# Patient Record
Sex: FEMALE | Race: WHITE | NOT HISPANIC OR LATINO
[De-identification: names, ages, dates, MRNs, and addresses within clinical notes are randomized per-mention and may not be internally consistent; named-entity substitution may affect disease eponyms.]

---

## 2018-01-05 PROBLEM — Z00.00 ENCOUNTER FOR PREVENTIVE HEALTH EXAMINATION: Status: ACTIVE | Noted: 2018-01-05

## 2018-02-01 ENCOUNTER — APPOINTMENT (OUTPATIENT)
Dept: GASTROENTEROLOGY | Facility: CLINIC | Age: 58
End: 2018-02-01
Payer: COMMERCIAL

## 2018-02-01 VITALS
WEIGHT: 214 LBS | HEIGHT: 63 IN | TEMPERATURE: 98.6 F | OXYGEN SATURATION: 99 % | DIASTOLIC BLOOD PRESSURE: 80 MMHG | SYSTOLIC BLOOD PRESSURE: 120 MMHG | HEART RATE: 82 BPM | BODY MASS INDEX: 37.92 KG/M2

## 2018-02-01 DIAGNOSIS — Z87.898 PERSONAL HISTORY OF OTHER SPECIFIED CONDITIONS: ICD-10-CM

## 2018-02-01 DIAGNOSIS — F15.90 OTHER STIMULANT USE, UNSPECIFIED, UNCOMPLICATED: ICD-10-CM

## 2018-02-01 DIAGNOSIS — Z78.9 OTHER SPECIFIED HEALTH STATUS: ICD-10-CM

## 2018-02-01 DIAGNOSIS — Z82.5 FAMILY HISTORY OF ASTHMA AND OTHER CHRONIC LOWER RESPIRATORY DISEASES: ICD-10-CM

## 2018-02-01 DIAGNOSIS — Z87.891 PERSONAL HISTORY OF NICOTINE DEPENDENCE: ICD-10-CM

## 2018-02-01 PROCEDURE — 99244 OFF/OP CNSLTJ NEW/EST MOD 40: CPT

## 2018-03-19 ENCOUNTER — APPOINTMENT (OUTPATIENT)
Dept: GASTROENTEROLOGY | Facility: AMBULATORY MEDICAL SERVICES | Age: 58
End: 2018-03-19
Payer: COMMERCIAL

## 2018-03-19 PROCEDURE — 45378 DIAGNOSTIC COLONOSCOPY: CPT

## 2018-03-19 PROCEDURE — 43239 EGD BIOPSY SINGLE/MULTIPLE: CPT | Mod: 59

## 2018-07-28 PROBLEM — Z78.9 ALCOHOL USE: Status: ACTIVE | Noted: 2018-02-01

## 2019-01-29 ENCOUNTER — APPOINTMENT (OUTPATIENT)
Dept: ORTHOPEDIC SURGERY | Facility: CLINIC | Age: 59
End: 2019-01-29
Payer: COMMERCIAL

## 2019-01-29 VITALS
HEIGHT: 63 IN | BODY MASS INDEX: 38.27 KG/M2 | HEART RATE: 74 BPM | SYSTOLIC BLOOD PRESSURE: 146 MMHG | DIASTOLIC BLOOD PRESSURE: 85 MMHG | WEIGHT: 216 LBS

## 2019-01-29 PROCEDURE — 73564 X-RAY EXAM KNEE 4 OR MORE: CPT | Mod: RT

## 2019-01-29 PROCEDURE — 99204 OFFICE O/P NEW MOD 45 MIN: CPT

## 2019-01-29 NOTE — PHYSICAL EXAM
[de-identified] : Patient is well nourished, well-developed, in no acute distress, with appropriate mood and affect. The patient is oriented to time, place, and person. Respirations are even and unlabored. Gait evaluation does not reveal a limp. There is no inguinal adenopathy. Examination of the contralateral knee shows normal range of motion, strength, no tenderness, and intact skin. The affected limb is well-perfused and showed 2+ dp/pt pulses, without skin lesions, shows a grossly normal motor and sensory examination. Knee motion is painless and the knee moves from 10-90 degrees. The knee is stable within that range-of-motion to AP and ML stress with a 1A Lachman, negative anterior or posterior drawer and no instability to varus or valgus stress. The alignment of the knee is 5 degrees of varus. No effusion or crepitus is noted.  Medial joint line tenderness.  Able to straight leg raise.  No tenderness to palpation about the lateral joint line, medial or lateral tibial plateau, medial or lateral femoral condyle, medial or lateral patellar facets, superior or inferior pole of the patella. June's is positive medially. Muscle strength is normal. Pedal pulses are palpable. Hip examination was negative. [de-identified] : Long standing knee, AP knee, lateral knee, and patellar views of the right knee were ordered and taken in the office and demonstrate degenerative joint disease of the patellofemoral compartment of the knee with joint space narrowing, osteophyte formation, and subchondral sclerosis.

## 2019-01-29 NOTE — DISCUSSION/SUMMARY
[de-identified] : This patient has right medial knee pain with some positive signs of meniscal injury.  Despite the fact that she does have patellofemoral osteoarthritis the main component of her pain is on the medial side of her knee.  I would like to obtain an MRI to better evaluate for a meniscal tear.  I would like the patient to follow-up with me in the office after obtaining the MRI to review imaging and plan next steps.  In the meantime I prescribed a course of Naprosyn as well as physical therapy.

## 2019-01-29 NOTE — DISCUSSION/SUMMARY
[de-identified] : This patient has right medial knee pain with some positive signs of meniscal injury.  Despite the fact that she does have patellofemoral osteoarthritis the main component of her pain is on the medial side of her knee.  I would like to obtain an MRI to better evaluate for a meniscal tear.  I would like the patient to follow-up with me in the office after obtaining the MRI to review imaging and plan next steps.  In the meantime I prescribed a course of Naprosyn as well as physical therapy.

## 2019-01-29 NOTE — HISTORY OF PRESENT ILLNESS
[de-identified] : This is very nice 58-year-old female experiencing 3 days of right knee pain, which is severe in intensity. The pain substantially limits activities of daily living. Walking tolerance is reduced.  The pain started after she slipped onto steps walking down some steps.  She does use a cane.  She does not use a knee brace.  The pain does not radiate down her leg and it is not associated with numbness or tingling or weakness.  She denies any catching or clicking or locking in the knee and the knee is not giving way on her.

## 2019-01-29 NOTE — HISTORY OF PRESENT ILLNESS
[de-identified] : This is very nice 58-year-old female experiencing 3 days of right knee pain, which is severe in intensity. The pain substantially limits activities of daily living. Walking tolerance is reduced.  The pain started after she slipped onto steps walking down some steps.  She does use a cane.  She does not use a knee brace.  The pain does not radiate down her leg and it is not associated with numbness or tingling or weakness.  She denies any catching or clicking or locking in the knee and the knee is not giving way on her.

## 2019-01-29 NOTE — PHYSICAL EXAM
[de-identified] : Patient is well nourished, well-developed, in no acute distress, with appropriate mood and affect. The patient is oriented to time, place, and person. Respirations are even and unlabored. Gait evaluation does not reveal a limp. There is no inguinal adenopathy. Examination of the contralateral knee shows normal range of motion, strength, no tenderness, and intact skin. The affected limb is well-perfused and showed 2+ dp/pt pulses, without skin lesions, shows a grossly normal motor and sensory examination. Knee motion is painless and the knee moves from 10-90 degrees. The knee is stable within that range-of-motion to AP and ML stress with a 1A Lachman, negative anterior or posterior drawer and no instability to varus or valgus stress. The alignment of the knee is 5 degrees of varus. No effusion or crepitus is noted.  Medial joint line tenderness.  Able to straight leg raise.  No tenderness to palpation about the lateral joint line, medial or lateral tibial plateau, medial or lateral femoral condyle, medial or lateral patellar facets, superior or inferior pole of the patella. June's is positive medially. Muscle strength is normal. Pedal pulses are palpable. Hip examination was negative. [de-identified] : Long standing knee, AP knee, lateral knee, and patellar views of the right knee were ordered and taken in the office and demonstrate degenerative joint disease of the patellofemoral compartment of the knee with joint space narrowing, osteophyte formation, and subchondral sclerosis.

## 2019-02-06 ENCOUNTER — OUTPATIENT (OUTPATIENT)
Dept: OUTPATIENT SERVICES | Facility: HOSPITAL | Age: 59
LOS: 1 days | End: 2019-02-06
Payer: COMMERCIAL

## 2019-02-06 ENCOUNTER — APPOINTMENT (OUTPATIENT)
Dept: MRI IMAGING | Facility: CLINIC | Age: 59
End: 2019-02-06

## 2019-02-06 DIAGNOSIS — M25.561 PAIN IN RIGHT KNEE: ICD-10-CM

## 2019-02-06 PROCEDURE — 73721 MRI JNT OF LWR EXTRE W/O DYE: CPT | Mod: 26,RT

## 2019-02-06 PROCEDURE — 73721 MRI JNT OF LWR EXTRE W/O DYE: CPT

## 2019-02-13 ENCOUNTER — APPOINTMENT (OUTPATIENT)
Dept: ORTHOPEDIC SURGERY | Facility: CLINIC | Age: 59
End: 2019-02-13
Payer: COMMERCIAL

## 2019-02-13 VITALS — HEIGHT: 63 IN | WEIGHT: 216 LBS | BODY MASS INDEX: 38.27 KG/M2

## 2019-02-13 PROCEDURE — 20610 DRAIN/INJ JOINT/BURSA W/O US: CPT | Mod: RT

## 2019-02-13 PROCEDURE — 99214 OFFICE O/P EST MOD 30 MIN: CPT | Mod: 25

## 2019-02-13 NOTE — HISTORY OF PRESENT ILLNESS
[0] : a current pain level of 0/10 [None] : No exacerbating factors are noted [de-identified] : Patient is an overweight female, PMHx gerd, presents to the office walking on her own complaining of right knee pain x 3 weeks, started after patient tripped and fell with her knee into ground,  progressively improving. Reports instability and weakness and swelling. Pain indicated to anteromedial aspect of knee radiating to posterior aspect, 5/10, constant, achy and sharp at times. Worsening with walking, laying down, prolonged sitting,  using stairs, or any activity that requires knee to move to the point affecting patient quality of life and preventing ADL's.  Some relief with rest, ice, seen Dr. Araiza who prescribed naproxen with some help. \par Patient denies any fever, chills, swelling, erythema, hematomas, numbness or tingling sensation,  buckling.\par

## 2019-02-13 NOTE — DISCUSSION/SUMMARY
[de-identified] : Moderate to severe degenerative arthritis of bilateral knees. We will try conservative treatment and since physical therapy range of motion strengthening. Patient is also considering an injection of the knee \par \par Right knee injection\par The risks and benefits of injection was discussed with the patient. Verbal consent was obtained from the patient. The area was prepped with Betadine. A lateral suprapatellar approach was used. The knee was injected with 2 cc of Depo-Medrol 2 cc of lidocaine.  The procedure well. Band-Aid was applied.\par Followup in 3 month

## 2019-02-13 NOTE — PHYSICAL EXAM
[Normal RUE] : Right Upper Extremity: No scars, rashes, lesions, ulcers, skin intact [Acute Distress] : not in acute distress [Obese] : obese [Normal] : No respiratory distress and lungs were clear to auscultation bilaterally [de-identified] : Patient appears stated age in no acute respiratory distress. Patient is alert oriented x3. Patient has normal mood and affect. Gait examination is normal\par Bilateral knee exam\par There is minimal to moderate effusion. Range of motion is 0-120°. Painful at the extremes of range of motion. \par There is medial and lateral joint line tenderness. \par Quadriceps strength is 4/5. There is some muscle loss in the quadriceps. \par Anteroposterior and mediolateral stability is intact June test is negative. Alignment of the knee is in 5° of varus.\par \par \par Bilateral hip exam\par On inspection of the hip shows skin is normal. No evidence of rash. \par No loss of muscle.  Abductor strength is 5 out of 5. Hip flexor strength is 5.\par Range of motion of the hip at 90° flexion internal rotation is 15° external rotation is 30° pain-free. \par Hip has good stability in anterior and posterior direction. \par On lateral decubitus  examination there is no tenderness in the greater trochanter. \par Lower Extremity Examination \par Bilateral lower extremity skin is normal. There is no rash. There is no edema and lymphadenopathy.  DP and PT pulses intact. Sensation is intact.\par  [de-identified] : patient presents to office with xrays and MRI\par shows moderate to severe degenerative arthritis of bilateral knees

## 2019-09-16 ENCOUNTER — APPOINTMENT (OUTPATIENT)
Dept: ORTHOPEDIC SURGERY | Facility: CLINIC | Age: 59
End: 2019-09-16
Payer: COMMERCIAL

## 2019-09-16 DIAGNOSIS — G89.29 PAIN IN RIGHT HIP: ICD-10-CM

## 2019-09-16 DIAGNOSIS — M25.551 PAIN IN RIGHT HIP: ICD-10-CM

## 2019-09-16 PROCEDURE — 99214 OFFICE O/P EST MOD 30 MIN: CPT | Mod: 25

## 2019-09-16 PROCEDURE — 73502 X-RAY EXAM HIP UNI 2-3 VIEWS: CPT | Mod: RT

## 2019-09-16 PROCEDURE — 20610 DRAIN/INJ JOINT/BURSA W/O US: CPT | Mod: RT

## 2020-02-12 ENCOUNTER — APPOINTMENT (OUTPATIENT)
Dept: ORTHOPEDIC SURGERY | Facility: CLINIC | Age: 60
End: 2020-02-12
Payer: COMMERCIAL

## 2020-02-12 PROCEDURE — 99214 OFFICE O/P EST MOD 30 MIN: CPT

## 2020-02-27 ENCOUNTER — APPOINTMENT (OUTPATIENT)
Dept: RADIOLOGY | Facility: CLINIC | Age: 60
End: 2020-02-27

## 2021-06-03 ENCOUNTER — APPOINTMENT (OUTPATIENT)
Dept: GASTROENTEROLOGY | Facility: CLINIC | Age: 61
End: 2021-06-03
Payer: COMMERCIAL

## 2021-06-03 DIAGNOSIS — K21.9 GASTRO-ESOPHAGEAL REFLUX DISEASE W/OUT ESOPHAGITIS: ICD-10-CM

## 2021-06-03 DIAGNOSIS — K59.09 OTHER CONSTIPATION: ICD-10-CM

## 2021-06-03 PROCEDURE — 99072 ADDL SUPL MATRL&STAF TM PHE: CPT

## 2021-06-03 PROCEDURE — 99244 OFF/OP CNSLTJ NEW/EST MOD 40: CPT

## 2021-06-03 RX ORDER — PANTOPRAZOLE 40 MG/1
40 TABLET, DELAYED RELEASE ORAL TWICE DAILY
Qty: 60 | Refills: 2 | Status: ACTIVE | COMMUNITY
Start: 2021-06-03 | End: 1900-01-01

## 2021-06-03 NOTE — ASSESSMENT
[FreeTextEntry1] : Trulance samples\par \par PPI \par \par 1) Bloating \par \par  A low FODMAP diet was discussed with the patient at length. The patient had multiple questions all of which were answered. I recommended a nutritionist. Also recommended that the patient keep a food diary. We discussed  options such as Vegetables. Fresh fruits. Dairy that is lactose-free, and hard cheeses, or ripened/matured cheeses including... Beef, pork, chicken, fish, eggs. Avoid breadcrumbs, marinades, and sauces/gravies that may be high in FODMAPs. Soy products including tofu, tempeh. Grains.\par \par \par 2) GAS\par \par We discussed Probiotics and limiting leafy vegetables and other changes\par \par 3) GERD\par \par We discussed a low acid , high protien diet \par \par Avoid Spicey oily greasy foods\par \par 4) Change in Bowels \par \par We discussed the proper use of fiber and water intake \par \par The causes of Bowel irregularities were discussed at length  We discussed : Eat three meals each day. Do not skip meals. Gradually increase the amount of FIBER  in your diet. Choose more whole grain breads, cereals and rice. Select more raw fruits and vegetables -- eat the peel, if appropriate. Read food labels and look for the "dietary fiber" content of foods. Good sources have 2 grams of fiber or more. Drink six to eight glasses of water each day. Limit highly refined and processed foods. \par \par \par Citrucel 1 scoop daily \par \par All questions answered\par \par Reading material given \par \par F/U in office after procedure or in 8 weeks \par \par \par The above medical issues were discussed in detail and all questions answered over a 45 minute period.\par

## 2021-06-03 NOTE — PHYSICAL EXAM
[General Appearance - Alert] : alert [General Appearance - In No Acute Distress] : in no acute distress [Sclera] : the sclera and conjunctiva were normal [PERRL With Normal Accommodation] : pupils were equal in size, round, and reactive to light [Extraocular Movements] : extraocular movements were intact [Outer Ear] : the ears and nose were normal in appearance [Oropharynx] : the oropharynx was normal [Neck Appearance] : the appearance of the neck was normal [Neck Cervical Mass (___cm)] : no neck mass was observed [Jugular Venous Distention Increased] : there was no jugular-venous distention [Thyroid Diffuse Enlargement] : the thyroid was not enlarged [Thyroid Nodule] : there were no palpable thyroid nodules [Auscultation Breath Sounds / Voice Sounds] : lungs were clear to auscultation bilaterally [Heart Rate And Rhythm] : heart rate was normal and rhythm regular [Heart Sounds] : normal S1 and S2 [Heart Sounds Gallop] : no gallops [Murmurs] : no murmurs [Heart Sounds Pericardial Friction Rub] : no pericardial rub [Normal] : normal [Soft, Nontender] : the abdomen was soft and nontender [No Mass] : no masses were palpated [Epigastric] : in the epigastric area [No HSM] : no hepatosplenomegaly noted [Abnormal Walk] : normal gait [Nail Clubbing] : no clubbing  or cyanosis of the fingernails [Musculoskeletal - Swelling] : no joint swelling seen [Motor Tone] : muscle strength and tone were normal [Skin Color & Pigmentation] : normal skin color and pigmentation [Skin Turgor] : normal skin turgor [] : no rash [Deep Tendon Reflexes (DTR)] : deep tendon reflexes were 2+ and symmetric [Sensation] : the sensory exam was normal to light touch and pinprick [No Focal Deficits] : no focal deficits

## 2021-06-15 DIAGNOSIS — Z12.11 ENCOUNTER FOR SCREENING FOR MALIGNANT NEOPLASM OF COLON: ICD-10-CM

## 2021-06-23 ENCOUNTER — APPOINTMENT (OUTPATIENT)
Dept: ORTHOPEDIC SURGERY | Facility: CLINIC | Age: 61
End: 2021-06-23
Payer: COMMERCIAL

## 2021-06-23 DIAGNOSIS — M16.11 UNILATERAL PRIMARY OSTEOARTHRITIS, RIGHT HIP: ICD-10-CM

## 2021-06-23 DIAGNOSIS — M17.12 UNILATERAL PRIMARY OSTEOARTHRITIS, LEFT KNEE: ICD-10-CM

## 2021-06-23 DIAGNOSIS — M17.11 UNILATERAL PRIMARY OSTEOARTHRITIS, RIGHT KNEE: ICD-10-CM

## 2021-06-23 DIAGNOSIS — M25.561 PAIN IN RIGHT KNEE: ICD-10-CM

## 2021-06-23 PROCEDURE — 99214 OFFICE O/P EST MOD 30 MIN: CPT | Mod: 25

## 2021-06-23 PROCEDURE — 20610 DRAIN/INJ JOINT/BURSA W/O US: CPT | Mod: RT

## 2021-06-23 PROCEDURE — 73502 X-RAY EXAM HIP UNI 2-3 VIEWS: CPT | Mod: RT

## 2021-06-23 PROCEDURE — 73562 X-RAY EXAM OF KNEE 3: CPT | Mod: LT

## 2021-06-24 PROBLEM — M17.12 PRIMARY OSTEOARTHRITIS OF LEFT KNEE: Status: ACTIVE | Noted: 2021-06-23

## 2021-06-24 PROBLEM — M16.11 PRIMARY OSTEOARTHRITIS OF RIGHT HIP: Status: ACTIVE | Noted: 2019-09-16

## 2021-07-20 ENCOUNTER — LABORATORY RESULT (OUTPATIENT)
Age: 61
End: 2021-07-20

## 2021-07-23 ENCOUNTER — APPOINTMENT (OUTPATIENT)
Dept: GASTROENTEROLOGY | Facility: HOSPITAL | Age: 61
End: 2021-07-23

## 2021-08-03 ENCOUNTER — INPATIENT (INPATIENT)
Facility: HOSPITAL | Age: 61
LOS: 2 days | Discharge: HOME HEALTH | DRG: 392 | End: 2021-08-06
Payer: COMMERCIAL

## 2021-08-03 VITALS
OXYGEN SATURATION: 98 % | WEIGHT: 199.96 LBS | TEMPERATURE: 98 F | SYSTOLIC BLOOD PRESSURE: 140 MMHG | DIASTOLIC BLOOD PRESSURE: 94 MMHG | RESPIRATION RATE: 18 BRPM | HEIGHT: 63 IN | HEART RATE: 110 BPM

## 2021-08-03 DIAGNOSIS — K57.80 DIVERTICULITIS OF INTESTINE, PART UNSPECIFIED, WITH PERFORATION AND ABSCESS WITHOUT BLEEDING: ICD-10-CM

## 2021-08-03 LAB
ALBUMIN SERPL ELPH-MCNC: 4.3 G/DL — SIGNIFICANT CHANGE UP (ref 3.3–5)
ALP SERPL-CCNC: 100 U/L — SIGNIFICANT CHANGE UP (ref 40–120)
ALT FLD-CCNC: 28 U/L — SIGNIFICANT CHANGE UP (ref 10–45)
ANION GAP SERPL CALC-SCNC: 12 MMOL/L — SIGNIFICANT CHANGE UP (ref 5–17)
APPEARANCE UR: CLEAR — SIGNIFICANT CHANGE UP
AST SERPL-CCNC: 24 U/L — SIGNIFICANT CHANGE UP (ref 10–40)
BACTERIA # UR AUTO: NEGATIVE — SIGNIFICANT CHANGE UP
BASE EXCESS BLDV CALC-SCNC: 3.9 MMOL/L — HIGH (ref -2–2)
BASOPHILS # BLD AUTO: 0.05 K/UL — SIGNIFICANT CHANGE UP (ref 0–0.2)
BASOPHILS NFR BLD AUTO: 0.3 % — SIGNIFICANT CHANGE UP (ref 0–2)
BILIRUB SERPL-MCNC: 0.7 MG/DL — SIGNIFICANT CHANGE UP (ref 0.2–1.2)
BILIRUB UR-MCNC: NEGATIVE — SIGNIFICANT CHANGE UP
BUN SERPL-MCNC: 12 MG/DL — SIGNIFICANT CHANGE UP (ref 7–23)
CA-I SERPL-SCNC: 1.14 MMOL/L — SIGNIFICANT CHANGE UP (ref 1.12–1.3)
CALCIUM SERPL-MCNC: 9.8 MG/DL — SIGNIFICANT CHANGE UP (ref 8.4–10.5)
CHLORIDE BLDV-SCNC: 103 MMOL/L — SIGNIFICANT CHANGE UP (ref 96–108)
CHLORIDE SERPL-SCNC: 98 MMOL/L — SIGNIFICANT CHANGE UP (ref 96–108)
CO2 BLDV-SCNC: 31 MMOL/L — HIGH (ref 22–30)
CO2 SERPL-SCNC: 26 MMOL/L — SIGNIFICANT CHANGE UP (ref 22–31)
COLOR SPEC: YELLOW — SIGNIFICANT CHANGE UP
CREAT SERPL-MCNC: 0.9 MG/DL — SIGNIFICANT CHANGE UP (ref 0.5–1.3)
DIFF PNL FLD: ABNORMAL
EOSINOPHIL # BLD AUTO: 0.03 K/UL — SIGNIFICANT CHANGE UP (ref 0–0.5)
EOSINOPHIL NFR BLD AUTO: 0.2 % — SIGNIFICANT CHANGE UP (ref 0–6)
EPI CELLS # UR: 1 /HPF — SIGNIFICANT CHANGE UP
GAS PNL BLDV: 133 MMOL/L — LOW (ref 135–145)
GAS PNL BLDV: SIGNIFICANT CHANGE UP
GAS PNL BLDV: SIGNIFICANT CHANGE UP
GLUCOSE BLDV-MCNC: 98 MG/DL — SIGNIFICANT CHANGE UP (ref 70–99)
GLUCOSE SERPL-MCNC: 102 MG/DL — HIGH (ref 70–99)
GLUCOSE UR QL: NEGATIVE — SIGNIFICANT CHANGE UP
HCO3 BLDV-SCNC: 29 MMOL/L — SIGNIFICANT CHANGE UP (ref 21–29)
HCT VFR BLD CALC: 45 % — SIGNIFICANT CHANGE UP (ref 34.5–45)
HCT VFR BLDA CALC: 49 % — SIGNIFICANT CHANGE UP (ref 39–50)
HGB BLD CALC-MCNC: 15.9 G/DL — HIGH (ref 11.5–15.5)
HGB BLD-MCNC: 14.9 G/DL — SIGNIFICANT CHANGE UP (ref 11.5–15.5)
HYALINE CASTS # UR AUTO: 0 /LPF — SIGNIFICANT CHANGE UP (ref 0–2)
IMM GRANULOCYTES NFR BLD AUTO: 0.5 % — SIGNIFICANT CHANGE UP (ref 0–1.5)
KETONES UR-MCNC: ABNORMAL
LACTATE BLDV-MCNC: 1.2 MMOL/L — SIGNIFICANT CHANGE UP (ref 0.7–2)
LEUKOCYTE ESTERASE UR-ACNC: NEGATIVE — SIGNIFICANT CHANGE UP
LIDOCAIN IGE QN: 15 U/L — SIGNIFICANT CHANGE UP (ref 7–60)
LYMPHOCYTES # BLD AUTO: 1.32 K/UL — SIGNIFICANT CHANGE UP (ref 1–3.3)
LYMPHOCYTES # BLD AUTO: 8.9 % — LOW (ref 13–44)
MCHC RBC-ENTMCNC: 29.6 PG — SIGNIFICANT CHANGE UP (ref 27–34)
MCHC RBC-ENTMCNC: 33.1 GM/DL — SIGNIFICANT CHANGE UP (ref 32–36)
MCV RBC AUTO: 89.3 FL — SIGNIFICANT CHANGE UP (ref 80–100)
MONOCYTES # BLD AUTO: 1.16 K/UL — HIGH (ref 0–0.9)
MONOCYTES NFR BLD AUTO: 7.9 % — SIGNIFICANT CHANGE UP (ref 2–14)
NEUTROPHILS # BLD AUTO: 12.13 K/UL — HIGH (ref 1.8–7.4)
NEUTROPHILS NFR BLD AUTO: 82.2 % — HIGH (ref 43–77)
NITRITE UR-MCNC: NEGATIVE — SIGNIFICANT CHANGE UP
NRBC # BLD: 0 /100 WBCS — SIGNIFICANT CHANGE UP (ref 0–0)
PCO2 BLDV: 48 MMHG — SIGNIFICANT CHANGE UP (ref 35–50)
PH BLDV: 7.4 — SIGNIFICANT CHANGE UP (ref 7.35–7.45)
PH UR: 6.5 — SIGNIFICANT CHANGE UP (ref 5–8)
PLATELET # BLD AUTO: 320 K/UL — SIGNIFICANT CHANGE UP (ref 150–400)
PO2 BLDV: 25 MMHG — SIGNIFICANT CHANGE UP (ref 25–45)
POTASSIUM BLDV-SCNC: 3.9 MMOL/L — SIGNIFICANT CHANGE UP (ref 3.5–5.3)
POTASSIUM SERPL-MCNC: 4.1 MMOL/L — SIGNIFICANT CHANGE UP (ref 3.5–5.3)
POTASSIUM SERPL-SCNC: 4.1 MMOL/L — SIGNIFICANT CHANGE UP (ref 3.5–5.3)
PROT SERPL-MCNC: 7.7 G/DL — SIGNIFICANT CHANGE UP (ref 6–8.3)
PROT UR-MCNC: ABNORMAL
RBC # BLD: 5.04 M/UL — SIGNIFICANT CHANGE UP (ref 3.8–5.2)
RBC # FLD: 12.6 % — SIGNIFICANT CHANGE UP (ref 10.3–14.5)
RBC CASTS # UR COMP ASSIST: 9 /HPF — HIGH (ref 0–4)
SAO2 % BLDV: 32 % — LOW (ref 67–88)
SODIUM SERPL-SCNC: 136 MMOL/L — SIGNIFICANT CHANGE UP (ref 135–145)
SP GR SPEC: 1.02 — SIGNIFICANT CHANGE UP (ref 1.01–1.02)
UROBILINOGEN FLD QL: NEGATIVE — SIGNIFICANT CHANGE UP
WBC # BLD: 14.76 K/UL — HIGH (ref 3.8–10.5)
WBC # FLD AUTO: 14.76 K/UL — HIGH (ref 3.8–10.5)
WBC UR QL: 2 /HPF — SIGNIFICANT CHANGE UP (ref 0–5)

## 2021-08-03 PROCEDURE — 99285 EMERGENCY DEPT VISIT HI MDM: CPT

## 2021-08-03 PROCEDURE — 74177 CT ABD & PELVIS W/CONTRAST: CPT | Mod: 26,MA

## 2021-08-03 RX ORDER — PIPERACILLIN AND TAZOBACTAM 4; .5 G/20ML; G/20ML
3.38 INJECTION, POWDER, LYOPHILIZED, FOR SOLUTION INTRAVENOUS ONCE
Refills: 0 | Status: COMPLETED | OUTPATIENT
Start: 2021-08-03 | End: 2021-08-03

## 2021-08-03 RX ORDER — DEXTROSE MONOHYDRATE, SODIUM CHLORIDE, AND POTASSIUM CHLORIDE 50; .745; 4.5 G/1000ML; G/1000ML; G/1000ML
1000 INJECTION, SOLUTION INTRAVENOUS
Refills: 0 | Status: DISCONTINUED | OUTPATIENT
Start: 2021-08-03 | End: 2021-08-05

## 2021-08-03 RX ORDER — ERTAPENEM SODIUM 1 G/1
1000 INJECTION, POWDER, LYOPHILIZED, FOR SOLUTION INTRAMUSCULAR; INTRAVENOUS EVERY 24 HOURS
Refills: 0 | Status: DISCONTINUED | OUTPATIENT
Start: 2021-08-04 | End: 2021-08-06

## 2021-08-03 RX ORDER — SODIUM CHLORIDE 9 MG/ML
1000 INJECTION, SOLUTION INTRAVENOUS ONCE
Refills: 0 | Status: COMPLETED | OUTPATIENT
Start: 2021-08-03 | End: 2021-08-03

## 2021-08-03 RX ORDER — ENOXAPARIN SODIUM 100 MG/ML
40 INJECTION SUBCUTANEOUS DAILY
Refills: 0 | Status: DISCONTINUED | OUTPATIENT
Start: 2021-08-03 | End: 2021-08-06

## 2021-08-03 RX ORDER — SODIUM CHLORIDE 9 MG/ML
1000 INJECTION INTRAMUSCULAR; INTRAVENOUS; SUBCUTANEOUS ONCE
Refills: 0 | Status: COMPLETED | OUTPATIENT
Start: 2021-08-03 | End: 2021-08-03

## 2021-08-03 RX ORDER — ERTAPENEM SODIUM 1 G/1
1000 INJECTION, POWDER, LYOPHILIZED, FOR SOLUTION INTRAMUSCULAR; INTRAVENOUS ONCE
Refills: 0 | Status: COMPLETED | OUTPATIENT
Start: 2021-08-03 | End: 2021-08-03

## 2021-08-03 RX ADMIN — SODIUM CHLORIDE 1000 MILLILITER(S): 9 INJECTION, SOLUTION INTRAVENOUS at 22:01

## 2021-08-03 RX ADMIN — SODIUM CHLORIDE 1000 MILLILITER(S): 9 INJECTION INTRAMUSCULAR; INTRAVENOUS; SUBCUTANEOUS at 12:31

## 2021-08-03 RX ADMIN — ERTAPENEM SODIUM 120 MILLIGRAM(S): 1 INJECTION, POWDER, LYOPHILIZED, FOR SOLUTION INTRAMUSCULAR; INTRAVENOUS at 16:33

## 2021-08-03 NOTE — ED PROVIDER NOTE - ATTENDING CONTRIBUTION TO CARE
Patient presenting complaining of three months of LLQ pains.  Initially diagnosed with diverticulitis, completed antibiotic course but pains persisting.  Outpatient GI was planning for endoscopy/colonoscopy but had issue with insurance paperwork.  History of prior UC as 19 year old, s/p resection.  Some associated nausea with pain but tolerating PO.  Pain present at baseline but worsens when she feels the need to pass stool.  Having small bowel movements at home, despite miralax/colace.    Exam:  General: Patient well appearing, vital signs within normal limits  HEENT: airway patent with moist mucous membranes  Cardiac: RRR S1/S2 with strong peripheral pulses  Respiratory: lungs clear without respiratory distress  GI: abdomen soft, non tender, non distended  Neuro: no gross neurologic deficits  Skin: warm, well perfused  Psych: normal mood and affect    Patient with multiple months of abdominal pains and abnormal stooling, history of prior UC s/p surgical resection.  Plan for labs, CT A/P to further evaluate.  Offered pain management but refused initially.

## 2021-08-03 NOTE — H&P ADULT - HISTORY OF PRESENT ILLNESS
PAST MEDICAL & SURGICAL HISTORY:      MEDICATIONS  (STANDING):  dextrose 5% + sodium chloride 0.45% with potassium chloride 20 mEq/L 1000 milliLiter(s) (125 mL/Hr) IV Continuous <Continuous>  enoxaparin Injectable 40 milliGRAM(s) SubCutaneous daily  lactated ringers Bolus 1000 milliLiter(s) IV Bolus once      ALLERGIES:    ___________________________________________  REVIEW OF SYSTEMS:  All ROS negative except as per HPI.    ___________________________________________  VITALS:  Vital Signs Last 24 Hrs  T(C): 36.9 (03 Aug 2021 11:09), Max: 36.9 (03 Aug 2021 11:09)  T(F): 98.5 (03 Aug 2021 11:09), Max: 98.5 (03 Aug 2021 11:09)  HR: 91 (03 Aug 2021 15:35) (91 - 110)  BP: 145/81 (03 Aug 2021 15:35) (137/86 - 145/81)  BP(mean): --  RR: 18 (03 Aug 2021 15:35) (18 - 18)  SpO2: 98% (03 Aug 2021 15:35) (96% - 98%)    CAPILLARY BLOOD GLUCOSE          I&O's Detail      PHYSICAL EXAM:  General: AAOx3, no acute distress.  Respiratory: breathing comfortably, no increased WOB   Abdomen: soft, nontender, nondistended, no rebound, no guarding  Extremities: Moves all four.     ____________________________________________  LABS:  CBC Full  -  ( 03 Aug 2021 12:40 )  WBC Count : 14.76 K/uL  RBC Count : 5.04 M/uL  Hemoglobin : 14.9 g/dL  Hematocrit : 45.0 %  Platelet Count - Automated : 320 K/uL  Mean Cell Volume : 89.3 fl  Mean Cell Hemoglobin : 29.6 pg  Mean Cell Hemoglobin Concentration : 33.1 gm/dL  Auto Neutrophil # : 12.13 K/uL  Auto Lymphocyte # : 1.32 K/uL  Auto Monocyte # : 1.16 K/uL  Auto Eosinophil # : 0.03 K/uL  Auto Basophil # : 0.05 K/uL  Auto Neutrophil % : 82.2 %  Auto Lymphocyte % : 8.9 %  Auto Monocyte % : 7.9 %  Auto Eosinophil % : 0.2 %  Auto Basophil % : 0.3 %        136  |  98  |  12  ----------------------------<  102<H>  4.1   |  26  |  0.90    Ca    9.8      03 Aug 2021 12:40    TPro  7.7  /  Alb  4.3  /  TBili  0.7  /  DBili  x   /  AST  24  /  ALT  28  /  AlkPhos  100  08-03    LIVER FUNCTIONS - ( 03 Aug 2021 12:40 )  Alb: 4.3 g/dL / Pro: 7.7 g/dL / ALK PHOS: 100 U/L / ALT: 28 U/L / AST: 24 U/L / GGT: x             Urinalysis Basic - ( 03 Aug 2021 14:42 )    Color: Yellow / Appearance: Clear / S.019 / pH: x  Gluc: x / Ketone: Moderate  / Bili: Negative / Urobili: Negative   Blood: x / Protein: Trace / Nitrite: Negative   Leuk Esterase: Negative / RBC: 9 /hpf / WBC 2 /HPF   Sq Epi: x / Non Sq Epi: 1 /hpf / Bacteria: Negative            ____________________________________________  RADIOLOGY & ADDITIONAL STUDIES:   Patient is a 60yo F with a history of ulcerative colitis (managed medically as a child, currently in remission), recent acute diverticulitis presenting with abdominal pain. Patient reports she was treated with cipro/flagyl for acute diverticulitis in [place] about 3 months ago but has been having worsening abdominal pain and nausea. Denies fevers, chills or emesis. Denies hematochezia or melena. Endorses loose BMs. Patient reports she has been trying to set up an outpatient colonoscopy but has not been able to schedule it. Her last colonoscopy was 4 years ago and reportedly normal.    In the ED, patient was initially tachycardic to 110 which improved to 90s with IVF resuscitation. Labs notable for WBC 14.8, normal lactate 1.2 CT scan showed acute contained perforated diverticulitis with no abscess. Received one dose of ertapenem.

## 2021-08-03 NOTE — H&P ADULT - ATTENDING COMMENTS
Prior episode of diverticulitis treated couple of months ago as outpatient.  This may be smoldering disease.  Patient with minimal tenderness.  No GI function yet so only sips and chips at this time.  Continue IV abx.

## 2021-08-03 NOTE — ED ADULT NURSE NOTE - OBJECTIVE STATEMENT
62 y/o presents to ED reporting over one month of abdominal pain. Pt reports being diagnosed with diverticulitis approximately one month ago and scheduled to go for colonoscpy/endoscopy but due to insurance reasons unable to get procedures done. On exam, AOx3, speaking in complete sentences. Unlabored, spontaneous respirations, NAD. Abdomen soft, non-tender, non-distended. Pt denies CP, SOB, n/v/d and fever/chills. Heplock placed, labs sent. Seen and evaluated by MD. Awaiting imaging at this time.

## 2021-08-03 NOTE — ED PROVIDER NOTE - OBJECTIVE STATEMENT
61 year old female PMHX diverticulitis presents to ED presenting with abdominal pain x3 months, associated with constipation, and nausea. Patient states she was diagnosed with diverticulitis x1 month ago confirmed by CT in [place], given abx, and advised to follow up with GI. When she followed up with GI he scheduled her for Colonscopy and endoscopy but was cancelled last week due to insurance reasons. Patient states pain has gotten intolerable, and wanted further eval of her pain. Patient states intermittent chills, nausea, denies fever, sick contacts, shortness of breath, chest pain, vomiting, leg swelling, recent travel.    Not vaccinated, Never got covid

## 2021-08-03 NOTE — H&P ADULT - NSHPLABSRESULTS_GEN_ALL_CORE
14.9   14.76 )-----------( 320      ( 03 Aug 2021 12:40 )             45.0       08    136  |  98  |  12  ----------------------------<  102<H>  4.1   |  26  |  0.90    Ca    9.8      03 Aug 2021 12:40    TPro  7.7  /  Alb  4.3  /  TBili  0.7  /  DBili  x   /  AST  24  /  ALT  28  /  AlkPhos  100  08-          Urinalysis Basic - ( 03 Aug 2021 14:42 )    Color: Yellow / Appearance: Clear / S.019 / pH: x  Gluc: x / Ketone: Moderate  / Bili: Negative / Urobili: Negative   Blood: x / Protein: Trace / Nitrite: Negative   Leuk Esterase: Negative / RBC: 9 /hpf / WBC 2 /HPF   Sq Epi: x / Non Sq Epi: 1 /hpf / Bacteria: Negative    Lactate Trend 1.2      _______________________________________  RADIOLOGY & ADDITIONAL STUDIES:    < from: CT Abdomen and Pelvis w/ IV Cont (21 @ 14:12) >    EXAM:  CT ABDOMEN AND PELVIS IC                          PROCEDURE DATE:  2021      INTERPRETATION:  CLINICAL INFORMATION: Abdominal pain associated with constipation and nausea. Diverticulitis one month ago.    COMPARISON: None.    CONTRAST/COMPLICATIONS:  IV Contrast: Omnipaque 350  90 cc administered   10 cc discarded  Oral Contrast: None  Complications: None reported at study completion    PROCEDURE:  CT of the Abdomen and Pelvis was performed.  Sagittal and coronal reformats were performed.    FINDINGS:  LOWER CHEST: Within normal limits.    LIVER: Within normal limits.  BILE DUCTS: Normal caliber.  GALLBLADDER: Within normal limits.  SPLEEN: Within normal limits.  PANCREAS: Within normal limits.  ADRENALS: Within normal limits.  KIDNEYS/URETERS: No hydronephrosis. Bilateral subcentimeter hypodense foci too small to characterize. A 5 mm nonobstructing left lower pole calculus.    BLADDER: Within normal limits.  REPRODUCTIVE ORGANS: Uterus and adnexa within normal limits.    BOWEL: Fat stranding and wall thickening involving a short segment region of sigmoid colon in a region of diverticulosis with contained extraluminal gas (2:89) and moderate pericolonic inflammatory changes. No bowel obstruction. Appendix is normal.  PERITONEUM: Contained extraluminal gas in the pelvis, as described above.  Trace ascites.  VESSELS: Within normal limits.  RETROPERITONEUM/LYMPH NODES: Prominent periaortic lymph nodes measuring less than 1 cm in short axis.  ABDOMINAL WALL: Within normal limits.  BONES: Degenerative changes of the spine.    IMPRESSION:  Acute, contained perforation of the sigmoid colon with moderate pericolonic inflammatory changes.  Findings may be secondary to perforated diverticulitis, however, colonoscopy is recommended after resolution of the acute symptomatology to exclude other etiologies.    Trace ascites.    Several small periaortic lymph nodes.    Nonobstructing 0.5 cm left intrarenal calculus.    < end of copied text >

## 2021-08-03 NOTE — H&P ADULT - ASSESSMENT
Patient is a 60yo F with a history of ulcerative colitis (managed medically as a child, currently in remission), recent acute diverticulitis presenting with abdominal pain found to have acute diverticulitis with contained perforation. Patient hemodynamically stable and nontoxic appearing.    Plan:  - Admit to [facility] [place] surgery floor bed, under Dr. Silvestre  - NPO/IVF  - Ertapenem (allergy to PCN)  - Exam prior to pain meds to assess for worsening clinical status  - DVT ppx    Discussed with fellow on behalf of attending.    Héctor Fraire, PGY2  [facility] [phone]

## 2021-08-03 NOTE — H&P ADULT - NSHPPHYSICALEXAM_GEN_ALL_CORE
VITALS:  Vital Signs Last 24 Hrs  T(C): 37.4 (03 Aug 2021 22:58), Max: 37.4 (03 Aug 2021 22:58)  T(F): 99.4 (03 Aug 2021 22:58), Max: 99.4 (03 Aug 2021 22:58)  HR: 88 (03 Aug 2021 22:58) (87 - 110)  BP: 148/89 (03 Aug 2021 22:58) (135/89 - 148/89)  BP(mean): --  RR: 18 (03 Aug 2021 22:58) (16 - 18)  SpO2: 99% (03 Aug 2021 22:58) (96% - 99%)    PHYSICAL EXAM:  Gen: No acute distress.  Pulm: No respiratory distress.   Abd: Soft, mild LLQ and suprapubic tenderness, nondistended, no rebound, no guarding.  Ext: warm, well-perfused

## 2021-08-03 NOTE — ED PROVIDER NOTE - PROGRESS NOTE DETAILS
Spoke to surgery regarding patients case. Recommending ertapenem and will come evaluate the patient. Spoke surgery, who states will see another consult first and than come evaluate patient. Spoke to surgery, states are going to evaluate patient now.

## 2021-08-03 NOTE — ED ADULT NURSE REASSESSMENT NOTE - NS ED NURSE REASSESS COMMENT FT1
Problem: Dysphagia (Adult) Goal: *Acute Goals and Plan of Care (Insert Text) Description Recommendations: 
Diet: mechanical-soft/honey-thick liquids (NO STRAWS) Meds: crushed in applesauce/pudding Aspiration Precautions Oral Care TID Other: SILENT ASPIRATION Goals:  Patient will: 1. Tolerate PO trials with 0 s/s overt distress in 4/5 trials 2. Utilize compensatory swallow strategies/maneuvers (decrease bite/sip, size/rate, alt. liq/sol) with min cues in 4/5 trials 3. Perform oral-motor/laryngeal exercises to increase oropharyngeal swallow function with min cues 4. Complete an objective swallow study (i.e., MBSS) to assess swallow integrity, r/o aspiration, and determine of safest LRD, min A - goal met 4/17/19 
 - Outcome: Progressing Towards Goal 
 
SPEECH LANGUAGE PATHOLOGY DYSPHAGIA TREATMENT Patient: Radha Brody (23 y.o. male) Date: 4/24/2019 Diagnosis: Chronic bilateral pleural effusions [J90] HCAP (healthcare-associated pneumonia) [J18.9] CHF (congestive heart failure) ([facility] [address] [I50.9] Bilateral pleural effusion [J90] HCAP (healthcare-associated pneumonia) [J18.9] CHF (congestive heart failure) ([facility]) [I50.9] Chronic bilateral pleural effusions Precautions: aspiration PLOF:as per H&P  
 
ASSESSMENT: 
Pt seen b/s for dysphagia tx. Cleared for po feeds by nursing as pt unable to participate in procedure this date due to being on Asprin. Pt awake, alert, breathy vocal quality with intermittent phonation breaks, hoarse vocal quality, edentulous, functional oral motor skills for mastication and deglutition. Pt participated in [facility] 4/17/19 with the following results: 
\"MBS completed with SILENT aspiration on thin and nectar-thick liquids via cup and straw sips. Attempted chin tuck for airway protection; however, unsuccessful in protecting airway secondary to absent epiglottic inversion.  Honey-thick liquids accepted via cup sips with moderate Pt medicated per EMAR. Awaiting surgical consultation at this time. Pt provided phone to call . penetration; pt requires cues for frequent throat clears to clear penetrate. Pudding and cracker accepted without aspiration. Honey-thick liquid wash utilized for cracker in attempt to clear residue; ~50% of residue cleared without aspiration. \"  
 Pt able to recall participating but unable to recall results. Reviewed results above with dietary recs and compensatory swallow strategies. Pt accepted honey thick liquids via cup and single presentation of mechanical soft solid with mildly prolonged mastication, moderate oral residue requiring spontaneous liquid wash to clear. Pt SpO2 fluctuating between 80- 93, ?effectiveness of monitor? Pt demo fairly timely swallow response with decreased laryngeal elevation, intermittent gurgly vocal quality requiring max verbal/ auditory cues to clear and repeat swallow, multiple swallow response. Educated pt to and encouraged to complete effortful swallows with each presentation, and supraglottic swallow. Pt continued to require mod verbal/ auditory cues throughout remainder of session to complete. Rec: mechanical soft solids with HONEY thick liquids for quality of life, high risk of aspiration, strict aspiration precautions. **Due to above with no epiglottic inversion, there is a great chance pt may be aspirating his own secretions. Pt/ family may benefit from Palliative care consult. SLP will continue to follow. Progression toward goals: 
?         Improving appropriately and progressing toward goals ? Improving slowly and progressing toward goals ? Not making progress toward goals and plan of care will be adjusted PLAN: 
Recommendations and Planned Interventions: 
mechanical soft solids with HONEY thick liquids for quality of life, high risk of aspiration, strict aspiration precautions. Patient continues to benefit from skilled intervention to address the above impairments. Continue treatment per established plan of care. Discharge Recommendations: To Be Determined SUBJECTIVE:  
Patient stated ? I'm mike thirsty? . 
 
OBJECTIVE:  
Cognitive and Communication Status: 
Neurologic State: Alert Orientation Level: Oriented to person Cognition: Follows commands Perception: Appears intact Perseveration: No perseveration noted Safety/Judgement: Fall prevention Dysphagia Treatment: 
Oral Assessment: 
Oral Assessment Labial: No impairment Dentition: Edentulous Oral Hygiene: good Lingual: No impairment Velum: No impairment Mandible: No impairment P.O. Trials: 
Patient Position: [facility] 60* Vocal quality prior to P.O.: Breathy, Low volume, Phonation breaks Consistency Presented: Honey thick liquid, Mechanical soft How Presented: SLP-fed/presented, Cup/sip Bolus Acceptance: No impairment Bolus Formation/Control: Impaired Type of Impairment: Mastication, Delayed Propulsion: Delayed (# of seconds) Oral Residue: 10-50% of bolus, Lingual 
 Initiation of Swallow: No impairment Laryngeal Elevation: Decreased Aspiration Signs/Symptoms: Decrease in O2 saturations, Change vocal quality Pharyngeal Phase Characteristics: Audible swallow, Altered vocal quality, Multiple swallows, Suspected pharyngeal residue Effective Modifications: Alternate liquids/solids, Cup/sip, Small sips and bites, Supraglottic swallow Cues for Modifications: Moderate Oral Phase Severity: Mild Pharyngeal Phase Severity : Severe Oral Motor Exercises: 
  
   
   
   
   
   
   
   
   
   
   
   
   
   
   
   
   
   
   
   
   
   
   
   
   
   
   
   
   
   
   
   
   
   
   
   
   
   
   
   
   
   
   
   
   
   
   
   
   
   
   
   
   
   
  
Exercises: 
Laryngeal Exercises: 
Sustained \"ah\": No 
  
  
Mendelsohn Maneuver: No 
  
  
Effortful Swallow: Yes Sets : 1 Reps : 5 
Supraglottic Swallow: No 
  
  
Super-Supraglottic Swallow: No 
  
  
Incentive Spirometer: No 
  
  
  
Maria Isabel: Yes Sets : 1 Reps : 5 
 Sing \"EEE\": Yes Sets : 1 Reps : 15 Shaker: No 
  
  
Look Up at Ceiling/Gargle: No 
  
  
Open Mouth Wide/Yawn: No 
  
  
Tongue Back & Hold: No 
  
  
Effortful Breath Hold: No 
  
  
Hard Glottal Attack: No 
  
  
PAIN: 
Pain level pre-treatment: 0/10 Pain level post-treatment: 0/10 Pain Intervention(s): Medication (see MAR); Rest, Ice, Repositioning Response to intervention: Nurse notified, See doc flow After treatment:  
?              Patient left in no apparent distress sitting up in chair ? Patient left in no apparent distress in bed 
? Call bell left within reach ? Nursing notified ? Family present ? Caregiver present ? Bed alarm activated COMMUNICATION/EDUCATION:  
? Aspiration precautions; swallow safety; compensatory techniques ? Patient unable to participate in education; education ongoing with staff ? Posted safety precautions in patient's room. ? Oral-motor/laryngeal strengthening exercises Noni Chen MS, CCC/SLP Time Calculation: 25 mins

## 2021-08-04 ENCOUNTER — TRANSCRIPTION ENCOUNTER (OUTPATIENT)
Age: 61
End: 2021-08-04

## 2021-08-04 LAB
ANION GAP SERPL CALC-SCNC: 13 MMOL/L — SIGNIFICANT CHANGE UP (ref 5–17)
BUN SERPL-MCNC: 8 MG/DL — SIGNIFICANT CHANGE UP (ref 7–23)
CALCIUM SERPL-MCNC: 9.1 MG/DL — SIGNIFICANT CHANGE UP (ref 8.4–10.5)
CHLORIDE SERPL-SCNC: 100 MMOL/L — SIGNIFICANT CHANGE UP (ref 96–108)
CO2 SERPL-SCNC: 21 MMOL/L — LOW (ref 22–31)
COVID-19 SPIKE DOMAIN AB INTERP: NEGATIVE — SIGNIFICANT CHANGE UP
COVID-19 SPIKE DOMAIN ANTIBODY RESULT: 0.4 U/ML — SIGNIFICANT CHANGE UP
CREAT SERPL-MCNC: 0.8 MG/DL — SIGNIFICANT CHANGE UP (ref 0.5–1.3)
GLUCOSE SERPL-MCNC: 122 MG/DL — HIGH (ref 70–99)
HCT VFR BLD CALC: 42.2 % — SIGNIFICANT CHANGE UP (ref 34.5–45)
HCV AB S/CO SERPL IA: 0.48 S/CO — SIGNIFICANT CHANGE UP (ref 0–0.99)
HCV AB SERPL-IMP: SIGNIFICANT CHANGE UP
HGB BLD-MCNC: 13.9 G/DL — SIGNIFICANT CHANGE UP (ref 11.5–15.5)
MAGNESIUM SERPL-MCNC: 2 MG/DL — SIGNIFICANT CHANGE UP (ref 1.6–2.6)
MCHC RBC-ENTMCNC: 29.6 PG — SIGNIFICANT CHANGE UP (ref 27–34)
MCHC RBC-ENTMCNC: 32.9 GM/DL — SIGNIFICANT CHANGE UP (ref 32–36)
MCV RBC AUTO: 89.8 FL — SIGNIFICANT CHANGE UP (ref 80–100)
NRBC # BLD: 0 /100 WBCS — SIGNIFICANT CHANGE UP (ref 0–0)
PHOSPHATE SERPL-MCNC: 2.6 MG/DL — SIGNIFICANT CHANGE UP (ref 2.5–4.5)
PLATELET # BLD AUTO: 286 K/UL — SIGNIFICANT CHANGE UP (ref 150–400)
POTASSIUM SERPL-MCNC: 3.7 MMOL/L — SIGNIFICANT CHANGE UP (ref 3.5–5.3)
POTASSIUM SERPL-SCNC: 3.7 MMOL/L — SIGNIFICANT CHANGE UP (ref 3.5–5.3)
RBC # BLD: 4.7 M/UL — SIGNIFICANT CHANGE UP (ref 3.8–5.2)
RBC # FLD: 12.7 % — SIGNIFICANT CHANGE UP (ref 10.3–14.5)
SARS-COV-2 IGG+IGM SERPL QL IA: 0.4 U/ML — SIGNIFICANT CHANGE UP
SARS-COV-2 IGG+IGM SERPL QL IA: NEGATIVE — SIGNIFICANT CHANGE UP
SARS-COV-2 RNA SPEC QL NAA+PROBE: SIGNIFICANT CHANGE UP
SODIUM SERPL-SCNC: 134 MMOL/L — LOW (ref 135–145)
WBC # BLD: 12.68 K/UL — HIGH (ref 3.8–10.5)
WBC # FLD AUTO: 12.68 K/UL — HIGH (ref 3.8–10.5)

## 2021-08-04 RX ORDER — SODIUM,POTASSIUM PHOSPHATES 278-250MG
2 POWDER IN PACKET (EA) ORAL ONCE
Refills: 0 | Status: COMPLETED | OUTPATIENT
Start: 2021-08-04 | End: 2021-08-04

## 2021-08-04 RX ORDER — POLYETHYLENE GLYCOL 3350 17 G/17G
17 POWDER, FOR SOLUTION ORAL ONCE
Refills: 0 | Status: COMPLETED | OUTPATIENT
Start: 2021-08-04 | End: 2021-08-04

## 2021-08-04 RX ORDER — SENNA PLUS 8.6 MG/1
1 TABLET ORAL AT BEDTIME
Refills: 0 | Status: DISCONTINUED | OUTPATIENT
Start: 2021-08-04 | End: 2021-08-06

## 2021-08-04 RX ADMIN — DEXTROSE MONOHYDRATE, SODIUM CHLORIDE, AND POTASSIUM CHLORIDE 125 MILLILITER(S): 50; .745; 4.5 INJECTION, SOLUTION INTRAVENOUS at 13:32

## 2021-08-04 RX ADMIN — POLYETHYLENE GLYCOL 3350 17 GRAM(S): 17 POWDER, FOR SOLUTION ORAL at 16:03

## 2021-08-04 RX ADMIN — ENOXAPARIN SODIUM 40 MILLIGRAM(S): 100 INJECTION SUBCUTANEOUS at 13:33

## 2021-08-04 RX ADMIN — DEXTROSE MONOHYDRATE, SODIUM CHLORIDE, AND POTASSIUM CHLORIDE 125 MILLILITER(S): 50; .745; 4.5 INJECTION, SOLUTION INTRAVENOUS at 16:04

## 2021-08-04 RX ADMIN — Medication 2 PACKET(S): at 13:32

## 2021-08-04 RX ADMIN — SENNA PLUS 1 TABLET(S): 8.6 TABLET ORAL at 22:25

## 2021-08-04 RX ADMIN — ERTAPENEM SODIUM 120 MILLIGRAM(S): 1 INJECTION, POWDER, LYOPHILIZED, FOR SOLUTION INTRAMUSCULAR; INTRAVENOUS at 16:03

## 2021-08-04 RX ADMIN — DEXTROSE MONOHYDRATE, SODIUM CHLORIDE, AND POTASSIUM CHLORIDE 125 MILLILITER(S): 50; .745; 4.5 INJECTION, SOLUTION INTRAVENOUS at 03:50

## 2021-08-04 NOTE — DISCHARGE NOTE PROVIDER - HOSPITAL COURSE
Patient is a 62 yo F with a history of ulcerative colitis (managed medically as a child, currently in remission), recent acute diverticulitis presenting with abdominal pain. Patient reports she was treated with cipro/flagyl for acute diverticulitis in [place] about 3 months ago but has been having worsening abdominal pain and nausea. Denies fevers, chills or emesis. Denies hematochezia or melena. Endorses loose BMs. Patient reports she has been trying to set up an outpatient colonoscopy but has not been able to schedule it. Her last colonoscopy was 4 years ago and reportedly normal. In the ED, patient was initially tachycardic to 110 which improved to 90s with IVF resuscitation. Labs notable for WBC 14.8, normal lactate 1.2 CT scan showed acute contained perforated diverticulitis with no abscess. Received one dose of ertapenem. The patient was admitted to Dr. Silvestre, was made NPO and started on IVF. The pt was started on IV abx and dvt ppx. Over night there were no acute overnight events. The patient is hemodynamically stable and non toxic appearing. The pt's diet was advanced as tolerated and iv abx were continued. 8/5: ****************    On day of discharge, the patient's vitals are stable, is tolerating a regular diet, voiding appropriately, ambulating well and pain controlled. The pt will f/u with Dr. Silvestre in 1-2 weeks outpt and will f/u with her PCP in 1-2 weeks.   Patient is a 60 yo F with a history of ulcerative colitis (managed medically as a child, currently in remission), recent acute diverticulitis presenting with abdominal pain. Patient reports she was treated with cipro/flagyl for acute diverticulitis in [place] about 3 months ago but has been having worsening abdominal pain and nausea. Denies fevers, chills or emesis. Denies hematochezia or melena. Endorses loose BMs. Patient reports she has been trying to set up an outpatient colonoscopy but has not been able to schedule it. Her last colonoscopy was 4 years ago and reportedly normal. In the ED, patient was initially tachycardic to 110 which improved to 90s with IVF resuscitation. Labs notable for WBC 14.8, normal lactate 1.2 CT scan showed acute contained perforated diverticulitis with no abscess. Received one dose of ertapenem. The patient was admitted to Dr. Silvestre, was made NPO and started on IVF. The pt was started on IV abx and dvt ppx. Over night there were no acute overnight events. The patient is hemodynamically stable and non toxic appearing. The pt's diet was advanced as tolerated and iv abx were continued. 8/5.     On day of discharge, the patient's vitals are stable, is tolerating a regular diet, voiding appropriately, ambulating well and pain controlled. The pt will f/u with Dr. Silvestre in 1-2 weeks outpt and will f/u with her PCP in 1-2 weeks.   Patient is a 60 yo F with a history of ulcerative colitis (managed medically as a child, currently in remission), recent acute diverticulitis presenting with abdominal pain. Patient reports she was treated with cipro/flagyl for acute diverticulitis in [place] about 3 months ago but has been having worsening abdominal pain and nausea. Denies fevers, chills or emesis. Denies hematochezia or melena. Endorses loose BMs. Patient reports she has been trying to set up an outpatient colonoscopy but has not been able to schedule it. Her last colonoscopy was 4 years ago and reportedly normal. In the ED, patient was initially tachycardic to 110 which improved to 90s with IVF resuscitation. Labs notable for WBC 14.8, normal lactate 1.2 CT scan showed acute contained perforated diverticulitis with no abscess. Received one dose of ertapenem. The patient was admitted to Dr. Silvestre, was made NPO and started on IVF. The pt was started on IV abx and dvt ppx. Over night there were no acute overnight events. The patient is hemodynamically stable and non toxic appearing. The pt's diet was advanced as tolerated and iv abx were continued. 8/5: The patient is doing well, midline was placed. She will be going home with IV abx for 10 days. On day of discharge, the patient's vitals are stable, is tolerating a regular diet, voiding appropriately, ambulating well and pain controlled. The pt will f/u with Dr. Silvestre in 2 weeks outpt and will f/u with her PCP in 1-2 weeks.   Patient is a 60 yo F with a history of ulcerative colitis (managed medically as a child, currently in remission), recent acute diverticulitis presenting with abdominal pain. Patient reports she was treated with cipro/flagyl for acute diverticulitis in [place] about 3 months ago but has been having worsening abdominal pain and nausea. Denies fevers, chills or emesis. Denies hematochezia or melena. Endorses loose BMs. Patient reports she has been trying to set up an outpatient colonoscopy but has not been able to schedule it. Her last colonoscopy was 4 years ago and reportedly normal. In the ED, patient was initially tachycardic to 110 which improved to 90s with IVF resuscitation. Labs notable for WBC 14.8, normal lactate 1.2 CT scan showed acute contained perforated diverticulitis with no abscess. Received one dose of ertapenem. The patient was admitted to Dr. Silvestre, was made NPO and started on IVF. The pt was started on IV abx and dvt ppx. Over night there were no acute overnight events. The patient is hemodynamically stable and non toxic appearing. The pt's diet was advanced as tolerated and iv abx were continued. 8/5: The patient is doing well, midline was placed. She will be going home with IV abx for 10 days. On day of discharge, the patient's vitals are stable, is tolerating a regular diet, voiding appropriately, ambulating well and pain controlled. The pt will f/u with Dr. Silvestre in 2 weeks outpt and will f/u with her PCP in 2 weeks.   Patient is a 60 yo F with a history of ulcerative colitis (managed medically as a child, currently in remission), recent acute diverticulitis presenting with abdominal pain.  She was admitted for SIRS secondary to [place] 2  diverticulitis. Patient reports she was treated with cipro/flagyl for acute diverticulitis in [place] about 3 months ago but has been having worsening abdominal pain and nausea. Denies fevers, chills or emesis. Denies hematochezia or melena. Endorses loose BMs. Patient reports she has been trying to set up an outpatient colonoscopy but has not been able to schedule it. Her last colonoscopy was 4 years ago and reportedly normal. In the ED, patient was initially tachycardic to 110 which improved to 90s with IVF resuscitation. Labs notable for WBC 14.8, normal lactate 1.2 CT scan showed acute contained perforated diverticulitis with no abscess. Received one dose of ertapenem. The patient was admitted to Dr. Silvestre, was made NPO and started on IVF. The pt was started on IV abx and dvt ppx. Over night there were no acute overnight events. The patient is hemodynamically stable and non toxic appearing. The pt's diet was advanced as tolerated and iv abx were continued. 8/5: The patient is doing well, midline was placed. She will be going home with IV abx for 10 days. On day of discharge, the patient's vitals are stable, is tolerating a regular diet, voiding appropriately, ambulating well and pain controlled. The pt will f/u with Dr. Silvestre in 2 weeks outpt and will f/u with her PCP in 2 weeks.

## 2021-08-04 NOTE — PROGRESS NOTE ADULT - SUBJECTIVE AND OBJECTIVE BOX
SURGERY  Pager:    STATUS POST:      POST OPERATIVE DAY #      INTERVAL EVENTS/SUBJECTIVE: No acute events overnight. On morning rounds...     ______________________________________________  OBJECTIVE:   T(C): 37.3 (08-04-21 @ 00:28), Max: 37.4 (08-03-21 @ 22:58)  HR: 85 (08-04-21 @ 00:28) (85 - 110)  BP: 145/79 (08-04-21 @ 00:28) (135/89 - 148/89)  RR: 18 (08-04-21 @ 00:28) (16 - 18)  SpO2: 97% (08-04-21 @ 00:28) (96% - 99%)  Wt(kg): --  CAPILLARY BLOOD GLUCOSE        I&O's Detail      Physical exam:  Gen: resting in bed comfortably in NAD  Chest: no increased WOB, regular inspiratory effort   Abdomen: Soft, nontender, nondistended with no rebound tenderness or guarding. Incisions clean, dry, intact, with no erythema.   Vascular: WWP, SCHULTZ x4  NEURO: awake, alert  ______________________________________________  LABS:  CBC Full  -  ( 03 Aug 2021 12:40 )  WBC Count : 14.76 K/uL  RBC Count : 5.04 M/uL  Hemoglobin : 14.9 g/dL  Hematocrit : 45.0 %  Platelet Count - Automated : 320 K/uL  Mean Cell Volume : 89.3 fl  Mean Cell Hemoglobin : 29.6 pg  Mean Cell Hemoglobin Concentration : 33.1 gm/dL  Auto Neutrophil # : 12.13 K/uL  Auto Lymphocyte # : 1.32 K/uL  Auto Monocyte # : 1.16 K/uL  Auto Eosinophil # : 0.03 K/uL  Auto Basophil # : 0.05 K/uL  Auto Neutrophil % : 82.2 %  Auto Lymphocyte % : 8.9 %  Auto Monocyte % : 7.9 %  Auto Eosinophil % : 0.2 %  Auto Basophil % : 0.3 %    08-03    136  |  98  |  12  ----------------------------<  102<H>  4.1   |  26  |  0.90    Ca    9.8      03 Aug 2021 12:40    TPro  7.7  /  Alb  4.3  /  TBili  0.7  /  DBili  x   /  AST  24  /  ALT  28  /  AlkPhos  100  08-03    _____________________________________________  RADIOLOGY:     SURGERY  Pager: #[phone]    INTERVAL EVENTS/SUBJECTIVE: No acute events overnight. On morning rounds...     ______________________________________________  OBJECTIVE:   T(C): 37.3 (08-04-21 @ 00:28), Max: 37.4 (08-03-21 @ 22:58)  HR: 85 (08-04-21 @ 00:28) (85 - 110)  BP: 145/79 (08-04-21 @ 00:28) (135/89 - 148/89)  RR: 18 (08-04-21 @ 00:28) (16 - 18)  SpO2: 97% (08-04-21 @ 00:28) (96% - 99%)  Wt(kg): --  CAPILLARY BLOOD GLUCOSE        I&O's Detail      Physical exam:  Gen: resting in bed comfortably in NAD  Chest: no increased WOB, regular inspiratory effort   Abdomen: Soft, nontender, nondistended with no rebound tenderness or guarding. Incisions clean, dry, intact, with no erythema.   Vascular: WWP, SCHULTZ x4  NEURO: awake, alert  ______________________________________________  LABS:  CBC Full  -  ( 03 Aug 2021 12:40 )  WBC Count : 14.76 K/uL  RBC Count : 5.04 M/uL  Hemoglobin : 14.9 g/dL  Hematocrit : 45.0 %  Platelet Count - Automated : 320 K/uL  Mean Cell Volume : 89.3 fl  Mean Cell Hemoglobin : 29.6 pg  Mean Cell Hemoglobin Concentration : 33.1 gm/dL  Auto Neutrophil # : 12.13 K/uL  Auto Lymphocyte # : 1.32 K/uL  Auto Monocyte # : 1.16 K/uL  Auto Eosinophil # : 0.03 K/uL  Auto Basophil # : 0.05 K/uL  Auto Neutrophil % : 82.2 %  Auto Lymphocyte % : 8.9 %  Auto Monocyte % : 7.9 %  Auto Eosinophil % : 0.2 %  Auto Basophil % : 0.3 %    08-03    136  |  98  |  12  ----------------------------<  102<H>  4.1   |  26  |  0.90    Ca    9.8      03 Aug 2021 12:40    TPro  7.7  /  Alb  4.3  /  TBili  0.7  /  DBili  x   /  AST  24  /  ALT  28  /  AlkPhos  100  08-03    _____________________________________________  RADIOLOGY:     SURGERY  Pager: #[phone]    INTERVAL EVENTS/SUBJECTIVE: No acute events overnight. On morning rounds patient describes some difficulty with passing stool. She is hemodynamically stable and non toxic appearing.     ______________________________________________  OBJECTIVE:   T(C): 37.3 (08-04-21 @ 00:28), Max: 37.4 (08-03-21 @ 22:58)  HR: 85 (08-04-21 @ 00:28) (85 - 110)  BP: 145/79 (08-04-21 @ 00:28) (135/89 - 148/89)  RR: 18 (08-04-21 @ 00:28) (16 - 18)  SpO2: 97% (08-04-21 @ 00:28) (96% - 99%)  Wt(kg): --  CAPILLARY BLOOD GLUCOSE        I&O's Detail      Physical exam:  Gen: resting in bed comfortably in NAD  Chest: no increased WOB, regular inspiratory effort   Abdomen: Soft, nontender, nondistended with no rebound tenderness or guarding  Vascular: WWP, SCHULTZ x4  NEURO: awake, alert  ______________________________________________  LABS:  CBC Full  -  ( 03 Aug 2021 12:40 )  WBC Count : 14.76 K/uL  RBC Count : 5.04 M/uL  Hemoglobin : 14.9 g/dL  Hematocrit : 45.0 %  Platelet Count - Automated : 320 K/uL  Mean Cell Volume : 89.3 fl  Mean Cell Hemoglobin : 29.6 pg  Mean Cell Hemoglobin Concentration : 33.1 gm/dL  Auto Neutrophil # : 12.13 K/uL  Auto Lymphocyte # : 1.32 K/uL  Auto Monocyte # : 1.16 K/uL  Auto Eosinophil # : 0.03 K/uL  Auto Basophil # : 0.05 K/uL  Auto Neutrophil % : 82.2 %  Auto Lymphocyte % : 8.9 %  Auto Monocyte % : 7.9 %  Auto Eosinophil % : 0.2 %  Auto Basophil % : 0.3 %    08-03    136  |  98  |  12  ----------------------------<  102<H>  4.1   |  26  |  0.90    Ca    9.8      03 Aug 2021 12:40    TPro  7.7  /  Alb  4.3  /  TBili  0.7  /  DBili  x   /  AST  24  /  ALT  28  /  AlkPhos  100  08-03    _____________________________________________  RADIOLOGY:

## 2021-08-04 NOTE — DISCHARGE NOTE PROVIDER - NSDCMRMEDTOKEN_GEN_ALL_CORE_FT
INVanz ADD-[place] 1 g injection: 1 gram(s) intravenously every 24 hours   End date: 8/15/21  Diagnosis: K57.80   INVanz ADD-[place] 1 g injection: 1 gram(s) intravenously every 24 hours   End date: 8/15/21  Diagnosis: K57.80  senna oral tablet: 1 tab(s) orally once a day (at bedtime)

## 2021-08-04 NOTE — PROGRESS NOTE ADULT - ASSESSMENT
61F with a history of ulcerative colitis (managed medically as a child, currently in remission), recent acute diverticulitis presenting with abdominal pain found to have acute diverticulitis with contained perforation. Patient hemodynamically stable and nontoxic appearing.    Plan:  - NPO/IVF  - Ertapenem (allergy to PCN)  - Exam prior to pain meds to assess for worsening clinical status  - DVT ppx    Green Team Surgery   [id number]    61F with a history of ulcerative colitis (managed medically as a child, currently in remission), recent acute diverticulitis presenting with abdominal pain found to have acute diverticulitis with contained perforation. Patient hemodynamically stable and nontoxic appearing.    Plan:  - NPO with sips and ice chips/IVF for breakfast. Advance to clears for lunch if tolerating.   - Ertapenem (allergy to PCN)  - Exam prior to pain meds to assess for worsening clinical status  - DVT ppx    [facility] Surgery   [id number]

## 2021-08-04 NOTE — DISCHARGE NOTE PROVIDER - CARE PROVIDER_API CALL
Gonzalo Silvestre)  ColonRectal Surgery; Surgery  [address], [address]  [place] [address]  Phone: [phone]  Fax: [phone]  Follow Up Time: 1 week

## 2021-08-04 NOTE — DISCHARGE NOTE PROVIDER - INSTRUCTIONS
Low fiber diet Low Fiber Diet: Avoid raw fruits and vegetables especially ones with thick skin and salad. Thoroughly cooked vegetables that are soft and easily mashed with a fork are okay to eat. Bananas are also ok to eat.

## 2021-08-04 NOTE — DISCHARGE NOTE PROVIDER - NSDCCPCAREPLAN_GEN_ALL_CORE_FT
PRINCIPAL DISCHARGE DIAGNOSIS  Diagnosis: Perforated diverticulum  Assessment and Plan of Treatment:        PRINCIPAL DISCHARGE DIAGNOSIS  Diagnosis: Perforated diverticulum  Assessment and Plan of Treatment: iv antibiotics at home until 8/15   BATHING: Please do not submerge wound underwater. You may shower and/or sponge bathe.  ACTIVITY: No heavy lifting anything more than 10-15lbs or straining. Otherwise, you may return to your usual level of physical activity. If you are taking narcotic pain medication (such as Percocet), do NOT drive a car, operate machinery or make important decisions.  DIET: Low fiber diet  NOTIFY YOUR SURGEON IF: You have any fever (over 100.4 F) or chills, persistent nausea/vomiting with inability to tolerate food or liquids, persistent diarrhea, or if your pain is not controlled on your discharge pain medications.  FOLLOW-UP:  1. Please call to make a follow-up appointment within one week of discharge   2. Please follow up with your primary care physician in one week regarding your hospitalization.         PRINCIPAL DISCHARGE DIAGNOSIS  Diagnosis: Perforated diverticulum  Assessment and Plan of Treatment: iv antibiotics at home until 8/15   BATHING: Please do not submerge wound underwater. You may shower and/or sponge bathe.  ACTIVITY: No heavy lifting anything more than 10-15lbs or straining. Otherwise, you may return to your usual level of physical activity. If you are taking narcotic pain medication (such as Percocet), do NOT drive a car, operate machinery or make important decisions.  DIET: Low fiber diet  NOTIFY YOUR SURGEON IF: You have any fever (over 100.4 F) or chills, persistent nausea/vomiting with inability to tolerate food or liquids, persistent diarrhea, or if your pain is not controlled on your discharge pain medications.  FOLLOW-UP:  1. Please call to make a follow-up appointment within one week of discharge   2. Please follow up with your primary care physician in one week regarding your hospitalization.        SECONDARY DISCHARGE DIAGNOSES  Diagnosis: Systemic inflammatory response syndrome (SIRS)  Assessment and Plan of Treatment:

## 2021-08-04 NOTE — DISCHARGE NOTE PROVIDER - NSDCFUADDAPPT_GEN_ALL_CORE_FT
Please make an appointment and follow up outpatient with Dr. Silvestre in 1 week  Please make an appointment and follow up with your Primary Care Physician in 1-2 weeks   Please make an appointment and follow up outpatient with Dr. Silvestre in 2 weeks  Please make an appointment and follow up with your Primary Care Physician in 1-2 weeks

## 2021-08-05 ENCOUNTER — TRANSCRIPTION ENCOUNTER (OUTPATIENT)
Age: 61
End: 2021-08-05

## 2021-08-05 LAB
ANION GAP SERPL CALC-SCNC: 12 MMOL/L — SIGNIFICANT CHANGE UP (ref 5–17)
BUN SERPL-MCNC: 4 MG/DL — LOW (ref 7–23)
CALCIUM SERPL-MCNC: 9.3 MG/DL — SIGNIFICANT CHANGE UP (ref 8.4–10.5)
CHLORIDE SERPL-SCNC: 104 MMOL/L — SIGNIFICANT CHANGE UP (ref 96–108)
CO2 SERPL-SCNC: 22 MMOL/L — SIGNIFICANT CHANGE UP (ref 22–31)
CREAT SERPL-MCNC: 0.76 MG/DL — SIGNIFICANT CHANGE UP (ref 0.5–1.3)
GLUCOSE SERPL-MCNC: 103 MG/DL — HIGH (ref 70–99)
HCT VFR BLD CALC: 41.4 % — SIGNIFICANT CHANGE UP (ref 34.5–45)
HGB BLD-MCNC: 13.6 G/DL — SIGNIFICANT CHANGE UP (ref 11.5–15.5)
MAGNESIUM SERPL-MCNC: 2.1 MG/DL — SIGNIFICANT CHANGE UP (ref 1.6–2.6)
MCHC RBC-ENTMCNC: 29.8 PG — SIGNIFICANT CHANGE UP (ref 27–34)
MCHC RBC-ENTMCNC: 32.9 GM/DL — SIGNIFICANT CHANGE UP (ref 32–36)
MCV RBC AUTO: 90.6 FL — SIGNIFICANT CHANGE UP (ref 80–100)
NRBC # BLD: 0 /100 WBCS — SIGNIFICANT CHANGE UP (ref 0–0)
PHOSPHATE SERPL-MCNC: 3 MG/DL — SIGNIFICANT CHANGE UP (ref 2.5–4.5)
PLATELET # BLD AUTO: 309 K/UL — SIGNIFICANT CHANGE UP (ref 150–400)
POTASSIUM SERPL-MCNC: 3.9 MMOL/L — SIGNIFICANT CHANGE UP (ref 3.5–5.3)
POTASSIUM SERPL-SCNC: 3.9 MMOL/L — SIGNIFICANT CHANGE UP (ref 3.5–5.3)
RBC # BLD: 4.57 M/UL — SIGNIFICANT CHANGE UP (ref 3.8–5.2)
RBC # FLD: 12.7 % — SIGNIFICANT CHANGE UP (ref 10.3–14.5)
SODIUM SERPL-SCNC: 138 MMOL/L — SIGNIFICANT CHANGE UP (ref 135–145)
WBC # BLD: 8.99 K/UL — SIGNIFICANT CHANGE UP (ref 3.8–10.5)
WBC # FLD AUTO: 8.99 K/UL — SIGNIFICANT CHANGE UP (ref 3.8–10.5)

## 2021-08-05 RX ORDER — POLYETHYLENE GLYCOL 3350 17 G/17G
17 POWDER, FOR SOLUTION ORAL ONCE
Refills: 0 | Status: DISCONTINUED | OUTPATIENT
Start: 2021-08-05 | End: 2021-08-05

## 2021-08-05 RX ORDER — ACETAMINOPHEN 500 MG
650 TABLET ORAL ONCE
Refills: 0 | Status: COMPLETED | OUTPATIENT
Start: 2021-08-05 | End: 2021-08-05

## 2021-08-05 RX ORDER — ERTAPENEM SODIUM 1 G/1
1 INJECTION, POWDER, LYOPHILIZED, FOR SOLUTION INTRAMUSCULAR; INTRAVENOUS
Qty: 10 | Refills: 0
Start: 2021-08-05 | End: 2021-08-14

## 2021-08-05 RX ORDER — CALCIUM CARBONATE 500(1250)
1 TABLET ORAL ONCE
Refills: 0 | Status: DISCONTINUED | OUTPATIENT
Start: 2021-08-05 | End: 2021-08-05

## 2021-08-05 RX ADMIN — ENOXAPARIN SODIUM 40 MILLIGRAM(S): 100 INJECTION SUBCUTANEOUS at 12:13

## 2021-08-05 RX ADMIN — Medication 650 MILLIGRAM(S): at 21:36

## 2021-08-05 RX ADMIN — ERTAPENEM SODIUM 120 MILLIGRAM(S): 1 INJECTION, POWDER, LYOPHILIZED, FOR SOLUTION INTRAMUSCULAR; INTRAVENOUS at 17:03

## 2021-08-05 RX ADMIN — SENNA PLUS 1 TABLET(S): 8.6 TABLET ORAL at 21:36

## 2021-08-05 RX ADMIN — Medication 650 MILLIGRAM(S): at 22:06

## 2021-08-05 NOTE — DISCHARGE NOTE NURSING/CASE MANAGEMENT/SOCIAL WORK - NSDCFUADDAPPT_GEN_ALL_CORE_FT
Please make an appointment and follow up outpatient with Dr. Silvestre in 1 week  Please make an appointment and follow up with your Primary Care Physician in 1-2 weeks

## 2021-08-05 NOTE — DISCHARGE NOTE NURSING/CASE MANAGEMENT/SOCIAL WORK - PATIENT PORTAL LINK FT
You can access the FollowMyHealth Patient Portal offered by [facility] by registering at the following website: [email or web address]. By joining [employer]s FollowMyHealth portal, you will also be able to view your health information using other applications (apps) compatible with our system.

## 2021-08-05 NOTE — DISCHARGE NOTE NURSING/CASE MANAGEMENT/SOCIAL WORK - NSSCTYPOFSERV_GEN_ALL_CORE
HOME INFUSION VISIT TO START SAT- 8/7 , midline care, RN teaching of your iv abt infusion, provide meds and supplies

## 2021-08-05 NOTE — PROGRESS NOTE ADULT - SUBJECTIVE AND OBJECTIVE BOX
SURGERY  Pager: #[phone]    INTERVAL EVENTS/SUBJECTIVE: No acute events overnight. On morning rounds...     ______________________________________________  OBJECTIVE:   T(C): 36.4 (08-05-21 @ 01:31), Max: 36.9 (08-04-21 @ 04:06)  HR: 74 (08-05-21 @ 01:31) (74 - 84)  BP: 106/66 (08-05-21 @ 01:31) (106/66 - 147/83)  RR: 18 (08-05-21 @ 01:31) (18 - 18)  SpO2: 93% (08-05-21 @ 01:31) (93% - 97%)  Wt(kg): --  CAPILLARY BLOOD GLUCOSE        I&O's Detail    03 Aug 2021 07:01  -  04 Aug 2021 07:00  --------------------------------------------------------  IN:    dextrose 5% + sodium chloride 0.45% w/ Additives: 625 mL  Total IN: 625 mL    OUT:    Oral Fluid: 0 mL  Total OUT: 0 mL    Total NET: 625 mL      04 Aug 2021 07:01  -  05 Aug 2021 02:54  --------------------------------------------------------  IN:    dextrose 5% + sodium chloride 0.45% w/ Additives: 1500 mL    IV PiggyBack: 50 mL    Oral Fluid: 720 mL  Total IN: 2270 mL    OUT:    Voided (mL): 2050 mL  Total OUT: 2050 mL    Total NET: 220 mL          Physical exam:  Gen: resting in bed comfortably in NAD  Chest: no increased WOB, regular inspiratory effort   Abdomen: Soft, nontender, nondistended with no rebound tenderness or guarding. Incisions clean, dry, intact, with no erythema.   Vascular: WWP, SCHULTZ x4  NEURO: awake, alert  ______________________________________________  LABS:  CBC Full  -  ( 04 Aug 2021 07:32 )  WBC Count : 12.68 K/uL  RBC Count : 4.70 M/uL  Hemoglobin : 13.9 g/dL  Hematocrit : 42.2 %  Platelet Count - Automated : 286 K/uL  Mean Cell Volume : 89.8 fl  Mean Cell Hemoglobin : 29.6 pg  Mean Cell Hemoglobin Concentration : 32.9 gm/dL  Auto Neutrophil # : x  Auto Lymphocyte # : x  Auto Monocyte # : x  Auto Eosinophil # : x  Auto Basophil # : x  Auto Neutrophil % : x  Auto Lymphocyte % : x  Auto Monocyte % : x  Auto Eosinophil % : x  Auto Basophil % : x    08-04    134<L>  |  100  |  8   ----------------------------<  122<H>  3.7   |  21<L>  |  0.80    Ca    9.1      04 Aug 2021 07:32  Phos  2.6     08-04  Mg     2.0     08-04    TPro  7.7  /  Alb  4.3  /  TBili  0.7  /  DBili  x   /  AST  24  /  ALT  28  /  AlkPhos  100  08-03    _____________________________________________  RADIOLOGY:     SURGERY  Pager: #[phone]    INTERVAL EVENTS/SUBJECTIVE: Given senna overnight to help with bowel movement, which helped. On morning rounds, patient says she is feeling a lot better than yesterday, but still feels like she needs to strain to pass stool. Tolerating CLD.    ______________________________________________  OBJECTIVE:   T(C): 36.4 (08-05-21 @ 01:31), Max: 36.9 (08-04-21 @ 04:06)  HR: 74 (08-05-21 @ 01:31) (74 - 84)  BP: 106/66 (08-05-21 @ 01:31) (106/66 - 147/83)  RR: 18 (08-05-21 @ 01:31) (18 - 18)  SpO2: 93% (08-05-21 @ 01:31) (93% - 97%)  Wt(kg): --  CAPILLARY BLOOD GLUCOSE        I&O's Detail    03 Aug 2021 07:01  -  04 Aug 2021 07:00  --------------------------------------------------------  IN:    dextrose 5% + sodium chloride 0.45% w/ Additives: 625 mL  Total IN: 625 mL    OUT:    Oral Fluid: 0 mL  Total OUT: 0 mL    Total NET: 625 mL      04 Aug 2021 07:01  -  05 Aug 2021 02:54  --------------------------------------------------------  IN:    dextrose 5% + sodium chloride 0.45% w/ Additives: 1500 mL    IV PiggyBack: 50 mL    Oral Fluid: 720 mL  Total IN: 2270 mL    OUT:    Voided (mL): 2050 mL  Total OUT: 2050 mL    Total NET: 220 mL          Physical exam:  Gen: resting in bed comfortably in NAD  Chest: no increased WOB, regular inspiratory effort   Abdomen: Soft, nontender, nondistended with no rebound tenderness or guarding  Vascular: WWP, SCHULTZ x4  NEURO: awake, alert  ______________________________________________  LABS:  CBC Full  -  ( 04 Aug 2021 07:32 )  WBC Count : 12.68 K/uL  RBC Count : 4.70 M/uL  Hemoglobin : 13.9 g/dL  Hematocrit : 42.2 %  Platelet Count - Automated : 286 K/uL  Mean Cell Volume : 89.8 fl  Mean Cell Hemoglobin : 29.6 pg  Mean Cell Hemoglobin Concentration : 32.9 gm/dL  Auto Neutrophil # : x  Auto Lymphocyte # : x  Auto Monocyte # : x  Auto Eosinophil # : x  Auto Basophil # : x  Auto Neutrophil % : x  Auto Lymphocyte % : x  Auto Monocyte % : x  Auto Eosinophil % : x  Auto Basophil % : x    08-04    134<L>  |  100  |  8   ----------------------------<  122<H>  3.7   |  21<L>  |  0.80    Ca    9.1      04 Aug 2021 07:32  Phos  2.6     08-04  Mg     2.0     08-04    TPro  7.7  /  Alb  4.3  /  TBili  0.7  /  DBili  x   /  AST  24  /  ALT  28  /  AlkPhos  100  08-03    _____________________________________________  RADIOLOGY:

## 2021-08-05 NOTE — PROGRESS NOTE ADULT - ASSESSMENT
61F with a history of ulcerative colitis (managed medically as a child, currently in remission), recent acute diverticulitis presenting with abdominal pain found to have acute diverticulitis with contained perforation. Patient hemodynamically stable and nontoxic appearing.    Plan:  - NPO with sips and ice chips/IVF for breakfast. Advance to clears for lunch if tolerating.   - Ertapenem (allergy to PCN)  - Exam prior to pain meds to assess for worsening clinical status  - DVT ppx    Green Team Surgery   [id number]    61F with a history of ulcerative colitis (managed medically as a child, currently in remission), recent acute diverticulitis presenting with abdominal pain found to have acute diverticulitis with contained perforation. Patient hemodynamically stable and nontoxic appearing. Senna helping with bowel movement.    Plan:  - LRD for dinner it tolerating  - Keep on IVF  - Place midline   - Ertapenem (allergy to PCN)  - Exam prior to pain meds to assess for worsening clinical status  - DVT ppx    Green Team Surgery   [id number]

## 2021-08-06 ENCOUNTER — APPOINTMENT (OUTPATIENT)
Dept: GASTROENTEROLOGY | Facility: HOSPITAL | Age: 61
End: 2021-08-06

## 2021-08-06 VITALS
DIASTOLIC BLOOD PRESSURE: 77 MMHG | HEART RATE: 76 BPM | OXYGEN SATURATION: 95 % | SYSTOLIC BLOOD PRESSURE: 117 MMHG | RESPIRATION RATE: 18 BRPM | TEMPERATURE: 98 F

## 2021-08-06 LAB
ANION GAP SERPL CALC-SCNC: 12 MMOL/L — SIGNIFICANT CHANGE UP (ref 5–17)
BUN SERPL-MCNC: 9 MG/DL — SIGNIFICANT CHANGE UP (ref 7–23)
CALCIUM SERPL-MCNC: 9 MG/DL — SIGNIFICANT CHANGE UP (ref 8.4–10.5)
CHLORIDE SERPL-SCNC: 105 MMOL/L — SIGNIFICANT CHANGE UP (ref 96–108)
CO2 SERPL-SCNC: 24 MMOL/L — SIGNIFICANT CHANGE UP (ref 22–31)
CREAT SERPL-MCNC: 0.93 MG/DL — SIGNIFICANT CHANGE UP (ref 0.5–1.3)
GLUCOSE SERPL-MCNC: 93 MG/DL — SIGNIFICANT CHANGE UP (ref 70–99)
HCT VFR BLD CALC: 42.4 % — SIGNIFICANT CHANGE UP (ref 34.5–45)
HGB BLD-MCNC: 13.7 G/DL — SIGNIFICANT CHANGE UP (ref 11.5–15.5)
MAGNESIUM SERPL-MCNC: 2.1 MG/DL — SIGNIFICANT CHANGE UP (ref 1.6–2.6)
MCHC RBC-ENTMCNC: 29.3 PG — SIGNIFICANT CHANGE UP (ref 27–34)
MCHC RBC-ENTMCNC: 32.3 GM/DL — SIGNIFICANT CHANGE UP (ref 32–36)
MCV RBC AUTO: 90.8 FL — SIGNIFICANT CHANGE UP (ref 80–100)
NRBC # BLD: 0 /100 WBCS — SIGNIFICANT CHANGE UP (ref 0–0)
PHOSPHATE SERPL-MCNC: 4.3 MG/DL — SIGNIFICANT CHANGE UP (ref 2.5–4.5)
PLATELET # BLD AUTO: 306 K/UL — SIGNIFICANT CHANGE UP (ref 150–400)
POTASSIUM SERPL-MCNC: 4.1 MMOL/L — SIGNIFICANT CHANGE UP (ref 3.5–5.3)
POTASSIUM SERPL-SCNC: 4.1 MMOL/L — SIGNIFICANT CHANGE UP (ref 3.5–5.3)
RBC # BLD: 4.67 M/UL — SIGNIFICANT CHANGE UP (ref 3.8–5.2)
RBC # FLD: 12.8 % — SIGNIFICANT CHANGE UP (ref 10.3–14.5)
SODIUM SERPL-SCNC: 141 MMOL/L — SIGNIFICANT CHANGE UP (ref 135–145)
WBC # BLD: 5.73 K/UL — SIGNIFICANT CHANGE UP (ref 3.8–10.5)
WBC # FLD AUTO: 5.73 K/UL — SIGNIFICANT CHANGE UP (ref 3.8–10.5)

## 2021-08-06 PROCEDURE — 80048 BASIC METABOLIC PNL TOTAL CA: CPT

## 2021-08-06 PROCEDURE — C1751: CPT

## 2021-08-06 PROCEDURE — 84295 ASSAY OF SERUM SODIUM: CPT

## 2021-08-06 PROCEDURE — 85027 COMPLETE CBC AUTOMATED: CPT

## 2021-08-06 PROCEDURE — 84100 ASSAY OF PHOSPHORUS: CPT

## 2021-08-06 PROCEDURE — 82330 ASSAY OF CALCIUM: CPT

## 2021-08-06 PROCEDURE — 85018 HEMOGLOBIN: CPT

## 2021-08-06 PROCEDURE — 99285 EMERGENCY DEPT VISIT HI MDM: CPT | Mod: 25

## 2021-08-06 PROCEDURE — 74177 CT ABD & PELVIS W/CONTRAST: CPT | Mod: MA

## 2021-08-06 PROCEDURE — 85014 HEMATOCRIT: CPT

## 2021-08-06 PROCEDURE — 36569 INSJ PICC 5 YR+ W/O IMAGING: CPT

## 2021-08-06 PROCEDURE — 83735 ASSAY OF MAGNESIUM: CPT

## 2021-08-06 PROCEDURE — 81001 URINALYSIS AUTO W/SCOPE: CPT

## 2021-08-06 PROCEDURE — 85025 COMPLETE CBC W/AUTO DIFF WBC: CPT

## 2021-08-06 PROCEDURE — 96374 THER/PROPH/DIAG INJ IV PUSH: CPT

## 2021-08-06 PROCEDURE — 83605 ASSAY OF LACTIC ACID: CPT

## 2021-08-06 PROCEDURE — 82947 ASSAY GLUCOSE BLOOD QUANT: CPT

## 2021-08-06 PROCEDURE — 84132 ASSAY OF SERUM POTASSIUM: CPT

## 2021-08-06 PROCEDURE — 80053 COMPREHEN METABOLIC PANEL: CPT

## 2021-08-06 PROCEDURE — 82435 ASSAY OF BLOOD CHLORIDE: CPT

## 2021-08-06 PROCEDURE — 83690 ASSAY OF LIPASE: CPT

## 2021-08-06 PROCEDURE — U0005: CPT

## 2021-08-06 PROCEDURE — 86769 SARS-COV-2 COVID-19 ANTIBODY: CPT

## 2021-08-06 PROCEDURE — 86803 HEPATITIS C AB TEST: CPT

## 2021-08-06 PROCEDURE — 82803 BLOOD GASES ANY COMBINATION: CPT

## 2021-08-06 PROCEDURE — U0003: CPT

## 2021-08-06 RX ORDER — ERTAPENEM SODIUM 1 G/1
1000 INJECTION, POWDER, LYOPHILIZED, FOR SOLUTION INTRAMUSCULAR; INTRAVENOUS EVERY 24 HOURS
Refills: 0 | Status: DISCONTINUED | OUTPATIENT
Start: 2021-08-06 | End: 2021-08-06

## 2021-08-06 RX ORDER — SENNA PLUS 8.6 MG/1
1 TABLET ORAL
Qty: 0 | Refills: 0 | DISCHARGE
Start: 2021-08-06

## 2021-08-06 RX ADMIN — ERTAPENEM SODIUM 2.5 MILLIGRAM(S): 1 INJECTION, POWDER, LYOPHILIZED, FOR SOLUTION INTRAMUSCULAR; INTRAVENOUS at 13:08

## 2021-08-06 NOTE — PROGRESS NOTE ADULT - ASSESSMENT
61F with a history of ulcerative colitis (managed medically as a child, currently in remission), recent acute diverticulitis presenting with abdominal pain found to have acute diverticulitis with contained perforation. Patient hemodynamically stable and nontoxic appearing. Senna helping with bowel movement.    Plan:  - LRD for dinner it tolerating  - Keep on IVF  - Place midline   - Ertapenem (allergy to PCN)  - Exam prior to pain meds to assess for worsening clinical status  - DVT ppx    Green Team Surgery   [id number]    61F with a history of ulcerative colitis (managed medically as a child, currently in remission), recent acute diverticulitis presenting with abdominal pain found to have acute diverticulitis with contained perforation. Patient hemodynamically stable and nontoxic appearing. Senna helping with bowel movement.    Plan:  - LRD   - Midline placed   - Ertapenem (allergy to PCN) for 10 days post discharge  - DVT ppx    Green Team Surgery   [id number]

## 2021-08-06 NOTE — PROGRESS NOTE ADULT - REASON FOR ADMISSION
Acute diverticulitis with contained perforation

## 2021-08-06 NOTE — PROGRESS NOTE ADULT - SUBJECTIVE AND OBJECTIVE BOX
SURGERY  Pager: #[phone]    INTERVAL EVENTS/SUBJECTIVE: No acute events overnight. On morning rounds...     ______________________________________________  OBJECTIVE:   T(C): 36.4 (08-06-21 @ 01:23), Max: 37.2 (08-05-21 @ 09:15)  HR: 70 (08-06-21 @ 01:23) (70 - 76)  BP: 122/78 (08-06-21 @ 01:23) (108/70 - 142/82)  RR: 18 (08-06-21 @ 01:23) (16 - 18)  SpO2: 95% (08-06-21 @ 01:23) (93% - 96%)  Wt(kg): --  CAPILLARY BLOOD GLUCOSE        I&O's Detail    04 Aug 2021 07:01  -  05 Aug 2021 07:00  --------------------------------------------------------  IN:    dextrose 5% + sodium chloride 0.45% w/ Additives: 2625 mL    IV PiggyBack: 50 mL    Oral Fluid: 720 mL  Total IN: 3395 mL    OUT:    Voided (mL): 2850 mL  Total OUT: 2850 mL    Total NET: 545 mL      05 Aug 2021 07:01  -  06 Aug 2021 02:01  --------------------------------------------------------  IN:    Oral Fluid: 800 mL  Total IN: 800 mL    OUT:    Voided (mL): 825 mL  Total OUT: 825 mL    Total NET: -25 mL          Physical exam:  Gen: resting in bed comfortably in NAD  Chest: no increased WOB, regular inspiratory effort   Abdomen: Soft, nontender, nondistended with no rebound tenderness or guarding. Incisions clean, dry, intact, with no erythema.   Vascular: WWP, SCHULTZ x4  NEURO: awake, alert  ______________________________________________  LABS:  CBC Full  -  ( 05 Aug 2021 07:12 )  WBC Count : 8.99 K/uL  RBC Count : 4.57 M/uL  Hemoglobin : 13.6 g/dL  Hematocrit : 41.4 %  Platelet Count - Automated : 309 K/uL  Mean Cell Volume : 90.6 fl  Mean Cell Hemoglobin : 29.8 pg  Mean Cell Hemoglobin Concentration : 32.9 gm/dL  Auto Neutrophil # : x  Auto Lymphocyte # : x  Auto Monocyte # : x  Auto Eosinophil # : x  Auto Basophil # : x  Auto Neutrophil % : x  Auto Lymphocyte % : x  Auto Monocyte % : x  Auto Eosinophil % : x  Auto Basophil % : x    08-05    138  |  104  |  4<L>  ----------------------------<  103<H>  3.9   |  22  |  0.76    Ca    9.3      05 Aug 2021 07:12  Phos  3.0     08-05  Mg     2.1     08-05      _____________________________________________  RADIOLOGY:     SURGERY  Pager: #[phone]    INTERVAL EVENTS/SUBJECTIVE: No acute events overnight. Patient tolerating LRD, +/+, denies N/V, denies abdominal pain.    ______________________________________________  OBJECTIVE:   T(C): 36.4 (08-06-21 @ 01:23), Max: 37.2 (08-05-21 @ 09:15)  HR: 70 (08-06-21 @ 01:23) (70 - 76)  BP: 122/78 (08-06-21 @ 01:23) (108/70 - 142/82)  RR: 18 (08-06-21 @ 01:23) (16 - 18)  SpO2: 95% (08-06-21 @ 01:23) (93% - 96%)  Wt(kg): --  CAPILLARY BLOOD GLUCOSE        I&O's Detail    04 Aug 2021 07:01  -  05 Aug 2021 07:00  --------------------------------------------------------  IN:    dextrose 5% + sodium chloride 0.45% w/ Additives: 2625 mL    IV PiggyBack: 50 mL    Oral Fluid: 720 mL  Total IN: 3395 mL    OUT:    Voided (mL): 2850 mL  Total OUT: 2850 mL    Total NET: 545 mL      05 Aug 2021 07:01  -  06 Aug 2021 02:01  --------------------------------------------------------  IN:    Oral Fluid: 800 mL  Total IN: 800 mL    OUT:    Voided (mL): 825 mL  Total OUT: 825 mL    Total NET: -25 mL          Physical exam:  Gen: resting in bed comfortably in NAD  Chest: no increased WOB, regular inspiratory effort   Abdomen: Soft, nontender, nondistended with no rebound tenderness or guarding. Incisions clean, dry, intact, with no erythema.   Vascular: WWP, SCHULTZ x4  NEURO: awake, alert  ______________________________________________  LABS:  CBC Full  -  ( 05 Aug 2021 07:12 )  WBC Count : 8.99 K/uL  RBC Count : 4.57 M/uL  Hemoglobin : 13.6 g/dL  Hematocrit : 41.4 %  Platelet Count - Automated : 309 K/uL  Mean Cell Volume : 90.6 fl  Mean Cell Hemoglobin : 29.8 pg  Mean Cell Hemoglobin Concentration : 32.9 gm/dL  Auto Neutrophil # : x  Auto Lymphocyte # : x  Auto Monocyte # : x  Auto Eosinophil # : x  Auto Basophil # : x  Auto Neutrophil % : x  Auto Lymphocyte % : x  Auto Monocyte % : x  Auto Eosinophil % : x  Auto Basophil % : x    08-05    138  |  104  |  4<L>  ----------------------------<  103<H>  3.9   |  22  |  0.76    Ca    9.3      05 Aug 2021 07:12  Phos  3.0     08-05  Mg     2.1     08-05      _____________________________________________  RADIOLOGY:

## 2021-08-06 NOTE — PROGRESS NOTE ADULT - ATTENDING COMMENTS
Patient feels much better.  Home on Invanz for 10 days.  Will get repeat CT as outpatient
Patient much improved.  Had bowel movement.  Minimal tenderness.  White count normal.  Will advance diet later today.  Since patient failed Cipro and Flagyl as outpatient and has penicillin allergy will place midline for home Invanz.

## 2021-08-09 PROBLEM — K51.90 ULCERATIVE COLITIS, UNSPECIFIED, WITHOUT COMPLICATIONS: Chronic | Status: ACTIVE | Noted: 2021-08-03

## 2021-08-09 PROBLEM — K57.92 DIVERTICULITIS OF INTESTINE, PART UNSPECIFIED, WITHOUT PERFORATION OR ABSCESS WITHOUT BLEEDING: Chronic | Status: ACTIVE | Noted: 2021-08-03

## 2021-08-12 DIAGNOSIS — K57.92 DIVERTICULITIS OF INTESTINE, PART UNSPECIFIED, W/OUT PERFORATION OR ABSCESS W/OUT BLEEDING: ICD-10-CM

## 2021-08-18 ENCOUNTER — RESULT REVIEW (OUTPATIENT)
Age: 61
End: 2021-08-18

## 2021-08-18 ENCOUNTER — APPOINTMENT (OUTPATIENT)
Dept: CT IMAGING | Facility: IMAGING CENTER | Age: 61
End: 2021-08-18
Payer: COMMERCIAL

## 2021-08-18 ENCOUNTER — OUTPATIENT (OUTPATIENT)
Dept: OUTPATIENT SERVICES | Facility: HOSPITAL | Age: 61
LOS: 1 days | End: 2021-08-18
Payer: COMMERCIAL

## 2021-08-18 ENCOUNTER — TRANSCRIPTION ENCOUNTER (OUTPATIENT)
Age: 61
End: 2021-08-18

## 2021-08-18 DIAGNOSIS — Z00.8 ENCOUNTER FOR OTHER GENERAL EXAMINATION: ICD-10-CM

## 2021-08-18 DIAGNOSIS — K57.92 DIVERTICULITIS OF INTESTINE, PART UNSPECIFIED, WITHOUT PERFORATION OR ABSCESS WITHOUT BLEEDING: ICD-10-CM

## 2021-08-18 PROCEDURE — 74177 CT ABD & PELVIS W/CONTRAST: CPT | Mod: 26

## 2021-08-18 PROCEDURE — 74177 CT ABD & PELVIS W/CONTRAST: CPT

## 2021-08-19 ENCOUNTER — APPOINTMENT (OUTPATIENT)
Dept: SURGERY | Facility: CLINIC | Age: 61
End: 2021-08-19
Payer: COMMERCIAL

## 2021-08-19 VITALS
WEIGHT: 209 LBS | SYSTOLIC BLOOD PRESSURE: 120 MMHG | TEMPERATURE: 97.6 F | RESPIRATION RATE: 17 BRPM | OXYGEN SATURATION: 96 % | DIASTOLIC BLOOD PRESSURE: 80 MMHG | HEIGHT: 63 IN | HEART RATE: 78 BPM | BODY MASS INDEX: 37.03 KG/M2

## 2021-08-19 DIAGNOSIS — Z85.828 PERSONAL HISTORY OF OTHER MALIGNANT NEOPLASM OF SKIN: ICD-10-CM

## 2021-08-19 DIAGNOSIS — Z80.0 FAMILY HISTORY OF MALIGNANT NEOPLASM OF DIGESTIVE ORGANS: ICD-10-CM

## 2021-08-19 PROCEDURE — 99215 OFFICE O/P EST HI 40 MIN: CPT

## 2021-08-19 RX ORDER — POLYETHYLENE GLYCOL 3350, SODIUM SULFATE, SODIUM CHLORIDE, POTASSIUM CHLORIDE, ASCORBIC ACID, SODIUM ASCORBATE 140-9-5.2G
140 KIT ORAL
Qty: 1 | Refills: 0 | Status: DISCONTINUED | COMMUNITY
Start: 2021-06-15 | End: 2021-08-19

## 2021-08-19 RX ORDER — SODIUM SULFATE, POTASSIUM SULFATE, MAGNESIUM SULFATE 17.5; 3.13; 1.6 G/ML; G/ML; G/ML
17.5-3.13-1.6 SOLUTION, CONCENTRATE ORAL
Qty: 1 | Refills: 0 | Status: DISCONTINUED | COMMUNITY
Start: 2021-06-03 | End: 2021-08-19

## 2021-08-19 RX ORDER — RANITIDINE 150 MG/1
150 TABLET ORAL
Qty: 30 | Refills: 0 | Status: DISCONTINUED | COMMUNITY
Start: 2017-12-08 | End: 2021-08-19

## 2021-08-19 RX ORDER — MONTELUKAST 10 MG/1
10 TABLET, FILM COATED ORAL
Qty: 30 | Refills: 0 | Status: DISCONTINUED | COMMUNITY
Start: 2017-12-06 | End: 2021-08-19

## 2021-08-19 RX ORDER — OMEPRAZOLE 40 MG/1
40 CAPSULE, DELAYED RELEASE ORAL
Qty: 30 | Refills: 0 | Status: DISCONTINUED | COMMUNITY
Start: 2017-10-25 | End: 2021-08-19

## 2021-08-19 RX ORDER — NAPROXEN 500 MG/1
500 TABLET ORAL
Qty: 60 | Refills: 0 | Status: COMPLETED | COMMUNITY
Start: 2019-01-29 | End: 2021-08-19

## 2021-08-19 NOTE — PHYSICAL EXAM
[Normal Breath Sounds] : Normal breath sounds [Normal Heart Sounds] : normal heart sounds [No Rash or Lesion] : No rash or lesion [Alert] : alert [Oriented to Person] : oriented to person [Oriented to Place] : oriented to place [Oriented to Time] : oriented to time [Calm] : calm [Abdomen Masses] : No abdominal masses [Abdomen Tenderness] : ~T No ~M abdominal tenderness [Normal rectal exam] : exam was normal [JVD] : no jugular venous distention  [de-identified] : soft [de-identified] : WNL [de-identified] : WNL [de-identified] : ROM WNL

## 2021-08-19 NOTE — HISTORY OF PRESENT ILLNESS
[FreeTextEntry1] : Yoon is a 61 year old female here for follow up, discharged from hospital 8/6/21.\par \par Colonoscopy 3/9/18 by Dr. Brunner- retroflexed views revealed internal hemorrhoids. \par CT abdomen pelvis 5/6/21- acute sigmoid diverticulitis with pericolic fat stranding. no evidence for abscess. \par CT abdomen pelvis 8/3/21- acute, contained perforation of the sigmoid colon with moderate pericolonic inflammatory changed. findings may be secondary to perforated diverticulitis, however, colonoscopy is recommended after resolution of the acute symptomatology to exclude other etiologies. trace ascites. several small periaortic lymph nodes. nonobstructing 0.5 cm left intrarenal calculus. \par \par Today pt reports feeling well, no pain. Pt reports 2 episodes of diverticulitis. First episodes May 2021, treated with oral antibiotics. Most recent episode 8/3/21, went to ED and admitted, treated with IV antibiotics, pt discharged home on IV Invanz. Pt reports Last day of antibiotic will be 8/20/21. Pt reports lower abdominal pain with episodes. Pt reports 2 formed BMs daily, no pain, no rectal bleeding. No episodes of incontinence. Pt reports good appetite, no nausea, no vomiting.

## 2021-08-19 NOTE — ASSESSMENT
[FreeTextEntry1] : Repeat CT scan with persistent contained extraluminal air and mild inflammation.  Inflammatory changes are better than the previous CT scan but are still persistent.  Patient feels well and is nontender.  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  In addition she will likely continue to have episodes of diverticulitis and is at risk of perforation of the contained area.  Therefore, I recommended an elective laparoscopic resection.  If the patient agrees to do this the best approach appears to be to continue intravenous antibiotics until surgery is performed in the next couple of weeks.  Indications, risk, benefits, alternatives for laparoscopic possible open anterior resection reviewed including but not limited to bleeding, infection, recurrence, change in bowel habits, anastomotic leak, temporary ostomy, and intraoperative cystoscopy and ureteral catheters.

## 2021-08-20 DIAGNOSIS — Z01.818 ENCOUNTER FOR OTHER PREPROCEDURAL EXAMINATION: ICD-10-CM

## 2021-08-23 ENCOUNTER — OUTPATIENT (OUTPATIENT)
Dept: OUTPATIENT SERVICES | Facility: HOSPITAL | Age: 61
LOS: 1 days | End: 2021-08-23
Payer: COMMERCIAL

## 2021-08-23 VITALS
WEIGHT: 201.94 LBS | DIASTOLIC BLOOD PRESSURE: 80 MMHG | HEIGHT: 63 IN | SYSTOLIC BLOOD PRESSURE: 132 MMHG | TEMPERATURE: 98 F | RESPIRATION RATE: 16 BRPM | HEART RATE: 74 BPM | OXYGEN SATURATION: 97 %

## 2021-08-23 DIAGNOSIS — K21.9 GASTRO-ESOPHAGEAL REFLUX DISEASE WITHOUT ESOPHAGITIS: ICD-10-CM

## 2021-08-23 DIAGNOSIS — Z01.818 ENCOUNTER FOR OTHER PREPROCEDURAL EXAMINATION: ICD-10-CM

## 2021-08-23 DIAGNOSIS — K57.92 DIVERTICULITIS OF INTESTINE, PART UNSPECIFIED, WITHOUT PERFORATION OR ABSCESS WITHOUT BLEEDING: ICD-10-CM

## 2021-08-23 DIAGNOSIS — K57.20 DIVERTICULITIS OF LARGE INTESTINE WITH PERFORATION AND ABSCESS WITHOUT BLEEDING: ICD-10-CM

## 2021-08-23 DIAGNOSIS — Z98.890 OTHER SPECIFIED POSTPROCEDURAL STATES: Chronic | ICD-10-CM

## 2021-08-23 DIAGNOSIS — Z90.89 ACQUIRED ABSENCE OF OTHER ORGANS: Chronic | ICD-10-CM

## 2021-08-23 DIAGNOSIS — Z98.1 ARTHRODESIS STATUS: Chronic | ICD-10-CM

## 2021-08-23 DIAGNOSIS — Z29.9 ENCOUNTER FOR PROPHYLACTIC MEASURES, UNSPECIFIED: ICD-10-CM

## 2021-08-23 LAB
ALBUMIN SERPL ELPH-MCNC: 4.2 G/DL — SIGNIFICANT CHANGE UP (ref 3.3–5)
ALP SERPL-CCNC: 103 U/L — SIGNIFICANT CHANGE UP (ref 40–120)
ALT FLD-CCNC: 30 U/L — SIGNIFICANT CHANGE UP (ref 10–45)
ANION GAP SERPL CALC-SCNC: 12 MMOL/L — SIGNIFICANT CHANGE UP (ref 5–17)
AST SERPL-CCNC: 29 U/L — SIGNIFICANT CHANGE UP (ref 10–40)
BILIRUB SERPL-MCNC: 0.3 MG/DL — SIGNIFICANT CHANGE UP (ref 0.2–1.2)
BLD GP AB SCN SERPL QL: NEGATIVE — SIGNIFICANT CHANGE UP
BUN SERPL-MCNC: 19 MG/DL — SIGNIFICANT CHANGE UP (ref 7–23)
CALCIUM SERPL-MCNC: 10 MG/DL — SIGNIFICANT CHANGE UP (ref 8.4–10.5)
CHLORIDE SERPL-SCNC: 103 MMOL/L — SIGNIFICANT CHANGE UP (ref 96–108)
CO2 SERPL-SCNC: 23 MMOL/L — SIGNIFICANT CHANGE UP (ref 22–31)
CREAT SERPL-MCNC: 0.95 MG/DL — SIGNIFICANT CHANGE UP (ref 0.5–1.3)
GLUCOSE SERPL-MCNC: 94 MG/DL — SIGNIFICANT CHANGE UP (ref 70–99)
HCT VFR BLD CALC: 45.1 % — HIGH (ref 34.5–45)
HGB BLD-MCNC: 14.9 G/DL — SIGNIFICANT CHANGE UP (ref 11.5–15.5)
MCHC RBC-ENTMCNC: 28.9 PG — SIGNIFICANT CHANGE UP (ref 27–34)
MCHC RBC-ENTMCNC: 33 GM/DL — SIGNIFICANT CHANGE UP (ref 32–36)
MCV RBC AUTO: 87.6 FL — SIGNIFICANT CHANGE UP (ref 80–100)
NRBC # BLD: 0 /100 WBCS — SIGNIFICANT CHANGE UP (ref 0–0)
PLATELET # BLD AUTO: 344 K/UL — SIGNIFICANT CHANGE UP (ref 150–400)
POTASSIUM SERPL-MCNC: 4.3 MMOL/L — SIGNIFICANT CHANGE UP (ref 3.5–5.3)
POTASSIUM SERPL-SCNC: 4.3 MMOL/L — SIGNIFICANT CHANGE UP (ref 3.5–5.3)
PROT SERPL-MCNC: 7.3 G/DL — SIGNIFICANT CHANGE UP (ref 6–8.3)
RBC # BLD: 5.15 M/UL — SIGNIFICANT CHANGE UP (ref 3.8–5.2)
RBC # FLD: 12.7 % — SIGNIFICANT CHANGE UP (ref 10.3–14.5)
RH IG SCN BLD-IMP: POSITIVE — SIGNIFICANT CHANGE UP
SODIUM SERPL-SCNC: 138 MMOL/L — SIGNIFICANT CHANGE UP (ref 135–145)
WBC # BLD: 7.12 K/UL — SIGNIFICANT CHANGE UP (ref 3.8–10.5)
WBC # FLD AUTO: 7.12 K/UL — SIGNIFICANT CHANGE UP (ref 3.8–10.5)

## 2021-08-23 PROCEDURE — 93010 ELECTROCARDIOGRAM REPORT: CPT

## 2021-08-23 PROCEDURE — 85027 COMPLETE CBC AUTOMATED: CPT

## 2021-08-23 PROCEDURE — 93005 ELECTROCARDIOGRAM TRACING: CPT

## 2021-08-23 PROCEDURE — 87086 URINE CULTURE/COLONY COUNT: CPT

## 2021-08-23 PROCEDURE — 86850 RBC ANTIBODY SCREEN: CPT

## 2021-08-23 PROCEDURE — 86901 BLOOD TYPING SEROLOGIC RH(D): CPT

## 2021-08-23 PROCEDURE — 86900 BLOOD TYPING SEROLOGIC ABO: CPT

## 2021-08-23 PROCEDURE — G0463: CPT

## 2021-08-23 PROCEDURE — 83036 HEMOGLOBIN GLYCOSYLATED A1C: CPT

## 2021-08-23 PROCEDURE — 80053 COMPREHEN METABOLIC PANEL: CPT

## 2021-08-23 NOTE — H&P PST ADULT - NSICDXFAMILYHX_GEN_ALL_CORE_FT
FAMILY HISTORY:  Father  Still living? Unknown  Family history of emphysema, Age at diagnosis: Age Unknown    Mother  Still living? No  Family history of emphysema, Age at diagnosis: Age Unknown

## 2021-08-23 NOTE — H&P PST ADULT - ASSESSMENT
CAPRINI SCORE [CLOT updated 18]    AGE RELATED RISK FACTORS                                                       MOBILITY RELATED FACTORS  [ ] Age 41-60 years                                            (1 Point)                    [ ] Bed rest                                                        (1 Point)  [x ] Age: 61-74 years                                           (2 Points)                  [ ] Plaster cast                                                   (2 Points)  [ ] Age= 75 years                                              (3 Points)                    [ ] Bed bound for more than 72 hours                 (2 Points)    DISEASE RELATED RISK FACTORS                                               GENDER SPECIFIC FACTORS  [ ] Edema in the lower extremities                       (1 Point)              [ ] Pregnancy                                                     (1 Point)  [ ] Varicose veins                                               (1 Point)                     [ ] Post-partum < 6 weeks                                   (1 Point)             [x ] BMI > 25 Kg/m2                                            (1 Point)                     [ ] Hormonal therapy  or oral contraception          (1 Point)                 [ ] Sepsis (in the previous month)                        (1 Point)               [ ] History of pregnancy complications                 (1 point)  [ ] Pneumonia or serious lung disease                                               [ ] Unexplained or recurrent                     (1 Point)           (in the previous month)                               (1 Point)  [ ] Abnormal pulmonary function test                     (1 Point)                 SURGERY RELATED RISK FACTORS  [ ] Acute myocardial infarction                              (1 Point)               [ ]  Section                                             (1 Point)  [ ] Congestive heart failure (in the previous month)  (1 Point)      [ ] Minor surgery                                                  (1 Point)   [ ] Inflammatory bowel disease                             (1 Point)               [ ] Arthroscopic surgery                                        (2 Points)  [ ] Central venous access                                      (2 Points)                [x ] General surgery lasting more than 45 minutes (2 points)  [ ] Present or previous malignancy                     (2 Points)                [ ] Elective arthroplasty                                         (5 points)    [ ] Stroke (in the previous month)                          (5 Points)                                                                                                                                                           HEMATOLOGY RELATED FACTORS                                                 TRAUMA RELATED RISK FACTORS  [ ] Prior episodes of VTE                                     (3 Points)                [ ] Fracture of the hip, pelvis, or leg                       (5 Points)  [ ] Positive family history for VTE                         (3 Points)             [ ] Acute spinal cord injury (in the previous month)  (5 Points)  [ ] Prothrombin 13944 A                                     (3 Points)               [ ] Paralysis  (less than 1 month)                             (5 Points)  [ ] Factor V Leiden                                             (3 Points)                  [ ] Multiple Trauma within 1 month                        (5 Points)  [ ] Lupus anticoagulants                                     (3 Points)                                                           [ ] Anticardiolipin antibodies                               (3 Points)                                                       [ ] High homocysteine in the blood                      (3 Points)                                             [ ] Other congenital or acquired thrombophilia      (3 Points)                                                [ ] Heparin induced thrombocytopenia                  (3 Points)                                     Total Score [   5       ]

## 2021-08-23 NOTE — H&P PST ADULT - NSICDXPASTMEDICALHX_GEN_ALL_CORE_FT
PAST MEDICAL HISTORY:  Acute diverticulitis     GERD (gastroesophageal reflux disease)     H/O constipation     History of bunionectomy     OA (osteoarthritis)     Ulcerative colitis Resolved

## 2021-08-23 NOTE — H&P PST ADULT - HISTORY OF PRESENT ILLNESS
Patient is a 60yo F with a history of GERD, ulcerative colitis (managed medically as a child, currently in remission), s/p acute diverticulitis attack x 2 (5/6/21 & 8/3/21). Pt was treated with oral ABx in 5/2021 & IV ABx in 8/3/21(On Invanz, LD 8/29/21). Pt had GI consult- s/p CT abdomen &pelvis/colonoscopy- diverticulitis- scheduled for laparoscopic anterior resection, possible open on 8/30/21  Pt denies any fevers, chills, N/V, hematochezia , recent travel or Covid 19 related infections  **Covid 19 PCR on 8/27//21   Patient is a 62yo F with a history of GERD, ulcerative colitis (managed medically as a child, currently in remission), s/p acute diverticulitis attack x 2 (5/6/21 & 8/3/21). Pt was treated with oral ABx in 5/2021 & IV ABx in 8/3/21(On Invanz, LD 8/29/21). s/p CT abdomen &pelvis/colonoscopy- diverticulitis- scheduled for laparoscopic anterior resection, possible open on 8/30/21  Pt denies any fevers, chills, N/V, hematochezia , recent travel or Covid 19 related infections  **Covid 19 PCR on 8/27//21

## 2021-08-23 NOTE — H&P PST ADULT - PROBLEM SELECTOR PLAN 1
ERP  Laparoscopic anterior resection  Possible open  Labs- CBC, CMP, Hb A1C, Urine C&S, T&S  Pre op instructions discussed

## 2021-08-24 LAB
A1C WITH ESTIMATED AVERAGE GLUCOSE RESULT: 5.6 % — SIGNIFICANT CHANGE UP (ref 4–5.6)
CULTURE RESULTS: SIGNIFICANT CHANGE UP
ESTIMATED AVERAGE GLUCOSE: 114 MG/DL — SIGNIFICANT CHANGE UP (ref 68–114)
SPECIMEN SOURCE: SIGNIFICANT CHANGE UP

## 2021-08-25 PROBLEM — Z98.890 OTHER SPECIFIED POSTPROCEDURAL STATES: Chronic | Status: ACTIVE | Noted: 2021-08-23

## 2021-08-25 PROBLEM — M19.90 UNSPECIFIED OSTEOARTHRITIS, UNSPECIFIED SITE: Chronic | Status: ACTIVE | Noted: 2021-08-23

## 2021-08-25 PROBLEM — K21.9 GASTRO-ESOPHAGEAL REFLUX DISEASE WITHOUT ESOPHAGITIS: Chronic | Status: ACTIVE | Noted: 2021-08-23

## 2021-08-25 PROBLEM — Z87.19 PERSONAL HISTORY OF OTHER DISEASES OF THE DIGESTIVE SYSTEM: Chronic | Status: ACTIVE | Noted: 2021-08-23

## 2021-08-27 ENCOUNTER — OUTPATIENT (OUTPATIENT)
Dept: OUTPATIENT SERVICES | Facility: HOSPITAL | Age: 61
LOS: 1 days | End: 2021-08-27
Payer: COMMERCIAL

## 2021-08-27 DIAGNOSIS — Z90.89 ACQUIRED ABSENCE OF OTHER ORGANS: Chronic | ICD-10-CM

## 2021-08-27 DIAGNOSIS — Z11.52 ENCOUNTER FOR SCREENING FOR COVID-19: ICD-10-CM

## 2021-08-27 DIAGNOSIS — Z98.890 OTHER SPECIFIED POSTPROCEDURAL STATES: Chronic | ICD-10-CM

## 2021-08-27 DIAGNOSIS — Z98.1 ARTHRODESIS STATUS: Chronic | ICD-10-CM

## 2021-08-27 LAB — SARS-COV-2 RNA SPEC QL NAA+PROBE: SIGNIFICANT CHANGE UP

## 2021-08-27 PROCEDURE — C9803: CPT

## 2021-08-27 PROCEDURE — U0003: CPT

## 2021-08-27 PROCEDURE — U0005: CPT

## 2021-08-29 ENCOUNTER — TRANSCRIPTION ENCOUNTER (OUTPATIENT)
Age: 61
End: 2021-08-29

## 2021-08-30 ENCOUNTER — RESULT REVIEW (OUTPATIENT)
Age: 61
End: 2021-08-30

## 2021-08-30 ENCOUNTER — INPATIENT (INPATIENT)
Facility: HOSPITAL | Age: 61
LOS: 1 days | Discharge: HOME | DRG: 331 | End: 2021-09-01
Payer: COMMERCIAL

## 2021-08-30 ENCOUNTER — APPOINTMENT (OUTPATIENT)
Dept: SURGERY | Facility: HOSPITAL | Age: 61
End: 2021-08-30
Payer: COMMERCIAL

## 2021-08-30 VITALS
RESPIRATION RATE: 18 BRPM | DIASTOLIC BLOOD PRESSURE: 84 MMHG | SYSTOLIC BLOOD PRESSURE: 150 MMHG | HEART RATE: 75 BPM | OXYGEN SATURATION: 96 % | HEIGHT: 63 IN | WEIGHT: 201.94 LBS | TEMPERATURE: 98 F

## 2021-08-30 DIAGNOSIS — Z98.1 ARTHRODESIS STATUS: Chronic | ICD-10-CM

## 2021-08-30 DIAGNOSIS — K57.20 DIVERTICULITIS OF LARGE INTESTINE WITH PERFORATION AND ABSCESS WITHOUT BLEEDING: ICD-10-CM

## 2021-08-30 DIAGNOSIS — Z90.89 ACQUIRED ABSENCE OF OTHER ORGANS: Chronic | ICD-10-CM

## 2021-08-30 DIAGNOSIS — Z98.890 OTHER SPECIFIED POSTPROCEDURAL STATES: Chronic | ICD-10-CM

## 2021-08-30 LAB
GLUCOSE BLDC GLUCOMTR-MCNC: 121 MG/DL — HIGH (ref 70–99)
RH IG SCN BLD-IMP: POSITIVE — SIGNIFICANT CHANGE UP

## 2021-08-30 PROCEDURE — 88304 TISSUE EXAM BY PATHOLOGIST: CPT | Mod: 26

## 2021-08-30 PROCEDURE — 44213 LAP MOBIL SPLENIC FL ADD-ON: CPT

## 2021-08-30 PROCEDURE — 44207 L COLECTOMY/COLOPROCTOSTOMY: CPT

## 2021-08-30 PROCEDURE — 88307 TISSUE EXAM BY PATHOLOGIST: CPT | Mod: 26

## 2021-08-30 RX ORDER — ONDANSETRON 8 MG/1
4 TABLET, FILM COATED ORAL ONCE
Refills: 0 | Status: COMPLETED | OUTPATIENT
Start: 2021-08-30 | End: 2021-08-30

## 2021-08-30 RX ORDER — LIDOCAINE HCL 20 MG/ML
0.2 VIAL (ML) INJECTION ONCE
Refills: 0 | Status: DISCONTINUED | OUTPATIENT
Start: 2021-08-30 | End: 2021-08-30

## 2021-08-30 RX ORDER — CELECOXIB 200 MG/1
400 CAPSULE ORAL ONCE
Refills: 0 | Status: COMPLETED | OUTPATIENT
Start: 2021-08-30 | End: 2021-08-30

## 2021-08-30 RX ORDER — HEPARIN SODIUM 5000 [USP'U]/ML
5000 INJECTION INTRAVENOUS; SUBCUTANEOUS EVERY 8 HOURS
Refills: 0 | Status: DISCONTINUED | OUTPATIENT
Start: 2021-08-30 | End: 2021-09-01

## 2021-08-30 RX ORDER — SODIUM CHLORIDE 9 MG/ML
1000 INJECTION, SOLUTION INTRAVENOUS
Refills: 0 | Status: DISCONTINUED | OUTPATIENT
Start: 2021-08-30 | End: 2021-08-31

## 2021-08-30 RX ORDER — ACETAMINOPHEN 500 MG
1000 TABLET ORAL EVERY 6 HOURS
Refills: 0 | Status: COMPLETED | OUTPATIENT
Start: 2021-08-30 | End: 2021-08-31

## 2021-08-30 RX ORDER — OXYCODONE HYDROCHLORIDE 5 MG/1
2.5 TABLET ORAL EVERY 4 HOURS
Refills: 0 | Status: DISCONTINUED | OUTPATIENT
Start: 2021-08-30 | End: 2021-09-01

## 2021-08-30 RX ORDER — NALOXONE HYDROCHLORIDE 4 MG/.1ML
0.1 SPRAY NASAL
Refills: 0 | Status: DISCONTINUED | OUTPATIENT
Start: 2021-08-30 | End: 2021-08-31

## 2021-08-30 RX ORDER — MORPHINE SULFATE 50 MG/1
0.1 CAPSULE, EXTENDED RELEASE ORAL ONCE
Refills: 0 | Status: DISCONTINUED | OUTPATIENT
Start: 2021-08-30 | End: 2021-08-31

## 2021-08-30 RX ORDER — KETOROLAC TROMETHAMINE 30 MG/ML
30 SYRINGE (ML) INJECTION EVERY 6 HOURS
Refills: 0 | Status: DISCONTINUED | OUTPATIENT
Start: 2021-08-30 | End: 2021-08-31

## 2021-08-30 RX ORDER — ONDANSETRON 8 MG/1
4 TABLET, FILM COATED ORAL EVERY 6 HOURS
Refills: 0 | Status: DISCONTINUED | OUTPATIENT
Start: 2021-08-30 | End: 2021-08-30

## 2021-08-30 RX ORDER — PANTOPRAZOLE SODIUM 20 MG/1
1 TABLET, DELAYED RELEASE ORAL
Qty: 0 | Refills: 0 | DISCHARGE

## 2021-08-30 RX ORDER — IBUPROFEN 200 MG
400 TABLET ORAL EVERY 6 HOURS
Refills: 0 | Status: COMPLETED | OUTPATIENT
Start: 2021-08-30 | End: 2022-07-29

## 2021-08-30 RX ORDER — SODIUM CHLORIDE 9 MG/ML
3 INJECTION INTRAMUSCULAR; INTRAVENOUS; SUBCUTANEOUS EVERY 8 HOURS
Refills: 0 | Status: DISCONTINUED | OUTPATIENT
Start: 2021-08-30 | End: 2021-08-30

## 2021-08-30 RX ORDER — CHLORHEXIDINE GLUCONATE 213 G/1000ML
1 SOLUTION TOPICAL ONCE
Refills: 0 | Status: DISCONTINUED | OUTPATIENT
Start: 2021-08-30 | End: 2021-08-30

## 2021-08-30 RX ORDER — ACETAMINOPHEN 500 MG
1000 TABLET ORAL EVERY 6 HOURS
Refills: 0 | Status: DISCONTINUED | OUTPATIENT
Start: 2021-08-30 | End: 2021-08-31

## 2021-08-30 RX ORDER — GABAPENTIN 400 MG/1
600 CAPSULE ORAL ONCE
Refills: 0 | Status: COMPLETED | OUTPATIENT
Start: 2021-08-30 | End: 2021-08-30

## 2021-08-30 RX ORDER — OXYCODONE HYDROCHLORIDE 5 MG/1
5 TABLET ORAL EVERY 4 HOURS
Refills: 0 | Status: DISCONTINUED | OUTPATIENT
Start: 2021-08-30 | End: 2021-09-01

## 2021-08-30 RX ORDER — OXYCODONE HYDROCHLORIDE 5 MG/1
10 TABLET ORAL
Refills: 0 | Status: DISCONTINUED | OUTPATIENT
Start: 2021-08-30 | End: 2021-08-31

## 2021-08-30 RX ORDER — CIPROFLOXACIN LACTATE 400MG/40ML
400 VIAL (ML) INTRAVENOUS ONCE
Refills: 0 | Status: DISCONTINUED | OUTPATIENT
Start: 2021-08-30 | End: 2021-08-30

## 2021-08-30 RX ORDER — OXYCODONE HYDROCHLORIDE 5 MG/1
5 TABLET ORAL
Refills: 0 | Status: DISCONTINUED | OUTPATIENT
Start: 2021-08-30 | End: 2021-08-31

## 2021-08-30 RX ORDER — PANTOPRAZOLE SODIUM 20 MG/1
40 TABLET, DELAYED RELEASE ORAL
Refills: 0 | Status: DISCONTINUED | OUTPATIENT
Start: 2021-08-30 | End: 2021-09-01

## 2021-08-30 RX ADMIN — Medication 1000 MILLIGRAM(S): at 18:10

## 2021-08-30 RX ADMIN — Medication 1000 MILLIGRAM(S): at 23:56

## 2021-08-30 RX ADMIN — HEPARIN SODIUM 5000 UNIT(S): 5000 INJECTION INTRAVENOUS; SUBCUTANEOUS at 15:50

## 2021-08-30 RX ADMIN — Medication 30 MILLIGRAM(S): at 18:10

## 2021-08-30 RX ADMIN — Medication 30 MILLIGRAM(S): at 23:14

## 2021-08-30 RX ADMIN — GABAPENTIN 600 MILLIGRAM(S): 400 CAPSULE ORAL at 06:44

## 2021-08-30 RX ADMIN — PANTOPRAZOLE SODIUM 40 MILLIGRAM(S): 20 TABLET, DELAYED RELEASE ORAL at 17:40

## 2021-08-30 RX ADMIN — Medication 30 MILLIGRAM(S): at 17:40

## 2021-08-30 RX ADMIN — HEPARIN SODIUM 5000 UNIT(S): 5000 INJECTION INTRAVENOUS; SUBCUTANEOUS at 21:29

## 2021-08-30 RX ADMIN — CELECOXIB 400 MILLIGRAM(S): 200 CAPSULE ORAL at 06:44

## 2021-08-30 RX ADMIN — Medication 400 MILLIGRAM(S): at 17:40

## 2021-08-30 RX ADMIN — Medication 400 MILLIGRAM(S): at 23:14

## 2021-08-30 RX ADMIN — Medication 30 MILLIGRAM(S): at 23:56

## 2021-08-30 RX ADMIN — ONDANSETRON 4 MILLIGRAM(S): 8 TABLET, FILM COATED ORAL at 16:01

## 2021-08-30 NOTE — BRIEF OPERATIVE NOTE - NSICDXBRIEFPOSTOP_GEN_ALL_CORE_FT
POST-OP DIAGNOSIS:  Diverticulitis 30-Aug-2021 08:11:41  Teo Samaniego  
POST-OP DIAGNOSIS:  History of diverticulitis 30-Aug-2021 12:46:54  William Grier

## 2021-08-30 NOTE — CHART NOTE - NSCHARTNOTEFT_GEN_A_CORE
We have seen Macgilvray a few hours after her laparoscopic assisted Low Anterior Resection. She is doing well overnight, She states that her abdominal pain is being sufficiently controlled with anesthesia. No nausea or vomiting. She has no yet getting OOB. passed gas or had a bowel movement.    All other review of systems findings were negative.     ICU Vital Signs Last 24 Hrs  T(C): 36.4 (30 Aug 2021 18:12), Max: 36.5 (30 Aug 2021 06:50)  T(F): 97.6 (30 Aug 2021 18:12), Max: 97.7 (30 Aug 2021 06:50)  HR: 67 (30 Aug 2021 18:12) (63 - 105)  BP: 98/64 (30 Aug 2021 18:12) (83/46 - 150/84)  BP(mean): 68 (30 Aug 2021 17:00) (61 - 89)  RR: 18 (30 Aug 2021 18:12) (15 - 18)  SpO2: 96% (30 Aug 2021 18:12) (85% - 100%)    Physical Exam:   General: AAOx3, in NAD  Abdomen: soft ND NT       Macgilvray, a few hours after Lap Assisted LAR is recovering appropriately.     - ERP Protocol   - Diet: CLD ADAT to LRD  - IV Tylenol/Toradol switched to PO on POD2  - MagOx POD2  - D/C Penrose on POD3 or upon discharge We have seen Macgilvray a few hours after her laparoscopic assisted Low Anterior Resection. She is doing well overall. She states that she is not having any abdominal pain. She has not vomited. She has not yet tried getting out of bed. She has not yet passed gas or had a bowel movement. She is passing urine through her cheema catheter.     All other review of systems findings were positive for : dizziness and nausea.     ICU Vital Signs Last 24 Hrs  T(C): 36.4 (30 Aug 2021 18:12), Max: 36.5 (30 Aug 2021 06:50)  T(F): 97.6 (30 Aug 2021 18:12), Max: 97.7 (30 Aug 2021 06:50)  HR: 67 (30 Aug 2021 18:12) (63 - 105)  BP: 98/64 (30 Aug 2021 18:12) (83/46 - 150/84)  BP(mean): 68 (30 Aug 2021 17:00) (61 - 89)  RR: 18 (30 Aug 2021 18:12) (15 - 18)  SpO2: 96% (30 Aug 2021 18:12) (85% - 100%)    Physical Exam:   General: AAOx3, in NAD  Abdomen: soft ND NT, incisions are c/d/i  : cheema catheter contains tea colored urine      Macgilvray, a few hours after Lap Assisted LAR is recovering appropriately.     - ERP Protocol   - Diet: CLD ADAT to LRD  - IV Tylenol/Toradol switched to PO on POD2  - MagOx POD2  - D/C Penrose on POD3 or upon discharge  - D/C cheema catheter on POD2

## 2021-08-30 NOTE — BRIEF OPERATIVE NOTE - NSICDXBRIEFPROCEDURE_GEN_ALL_CORE_FT
PROCEDURES:  Cystoscopy, with ureteral catheterization 30-Aug-2021 08:11:21  Teo Samaniego  
PROCEDURES:  Laparoscopic assisted low anterior resection 30-Aug-2021 12:46:31  William Grier

## 2021-08-30 NOTE — PROVIDER CONTACT NOTE (OTHER) - ASSESSMENT
pt refusing 1st OOB. Pt states she is exhausted and hasn't slept in days. Pt educated on risks and benefits. Pt still refusing. Pt states she will get OOB tm.

## 2021-08-30 NOTE — BRIEF OPERATIVE NOTE - NSICDXBRIEFPREOP_GEN_ALL_CORE_FT
PRE-OP DIAGNOSIS:  Diverticulitis 30-Aug-2021 08:11:32  Teo Samaniego  
PRE-OP DIAGNOSIS:  History of diverticulitis 30-Aug-2021 12:46:44  William Grier

## 2021-08-31 ENCOUNTER — TRANSCRIPTION ENCOUNTER (OUTPATIENT)
Age: 61
End: 2021-08-31

## 2021-08-31 LAB
ANION GAP SERPL CALC-SCNC: 11 MMOL/L — SIGNIFICANT CHANGE UP (ref 5–17)
BILIRUB SERPL-MCNC: 0.4 MG/DL — SIGNIFICANT CHANGE UP (ref 0.2–1.2)
BUN SERPL-MCNC: 9 MG/DL — SIGNIFICANT CHANGE UP (ref 7–23)
CALCIUM SERPL-MCNC: 8.2 MG/DL — LOW (ref 8.4–10.5)
CHLORIDE SERPL-SCNC: 103 MMOL/L — SIGNIFICANT CHANGE UP (ref 96–108)
CO2 SERPL-SCNC: 24 MMOL/L — SIGNIFICANT CHANGE UP (ref 22–31)
CREAT SERPL-MCNC: 0.91 MG/DL — SIGNIFICANT CHANGE UP (ref 0.5–1.3)
GLUCOSE SERPL-MCNC: 86 MG/DL — SIGNIFICANT CHANGE UP (ref 70–99)
HCT VFR BLD CALC: 37.3 % — SIGNIFICANT CHANGE UP (ref 34.5–45)
HCT VFR BLD CALC: 38 % — SIGNIFICANT CHANGE UP (ref 34.5–45)
HGB BLD-MCNC: 12.1 G/DL — SIGNIFICANT CHANGE UP (ref 11.5–15.5)
HGB BLD-MCNC: 12.3 G/DL — SIGNIFICANT CHANGE UP (ref 11.5–15.5)
INR BLD: 1.32 RATIO — HIGH (ref 0.88–1.16)
MAGNESIUM SERPL-MCNC: 2.2 MG/DL — SIGNIFICANT CHANGE UP (ref 1.6–2.6)
MCHC RBC-ENTMCNC: 29.1 PG — SIGNIFICANT CHANGE UP (ref 27–34)
MCHC RBC-ENTMCNC: 29.1 PG — SIGNIFICANT CHANGE UP (ref 27–34)
MCHC RBC-ENTMCNC: 32.4 GM/DL — SIGNIFICANT CHANGE UP (ref 32–36)
MCHC RBC-ENTMCNC: 32.4 GM/DL — SIGNIFICANT CHANGE UP (ref 32–36)
MCV RBC AUTO: 89.7 FL — SIGNIFICANT CHANGE UP (ref 80–100)
MCV RBC AUTO: 90 FL — SIGNIFICANT CHANGE UP (ref 80–100)
MELD SCORE WITH DIALYSIS: 23 POINTS — SIGNIFICANT CHANGE UP
MELD SCORE WITHOUT DIALYSIS: 10 POINTS — SIGNIFICANT CHANGE UP
NRBC # BLD: 0 /100 WBCS — SIGNIFICANT CHANGE UP (ref 0–0)
NRBC # BLD: 0 /100 WBCS — SIGNIFICANT CHANGE UP (ref 0–0)
PHOSPHATE SERPL-MCNC: 4 MG/DL — SIGNIFICANT CHANGE UP (ref 2.5–4.5)
PLATELET # BLD AUTO: 216 K/UL — SIGNIFICANT CHANGE UP (ref 150–400)
PLATELET # BLD AUTO: 243 K/UL — SIGNIFICANT CHANGE UP (ref 150–400)
POTASSIUM SERPL-MCNC: 4.2 MMOL/L — SIGNIFICANT CHANGE UP (ref 3.5–5.3)
POTASSIUM SERPL-SCNC: 4.2 MMOL/L — SIGNIFICANT CHANGE UP (ref 3.5–5.3)
PROTHROM AB SERPL-ACNC: 15.6 SEC — HIGH (ref 10.6–13.6)
RBC # BLD: 4.16 M/UL — SIGNIFICANT CHANGE UP (ref 3.8–5.2)
RBC # BLD: 4.22 M/UL — SIGNIFICANT CHANGE UP (ref 3.8–5.2)
RBC # FLD: 13.3 % — SIGNIFICANT CHANGE UP (ref 10.3–14.5)
RBC # FLD: 13.6 % — SIGNIFICANT CHANGE UP (ref 10.3–14.5)
SODIUM SERPL-SCNC: 138 MMOL/L — SIGNIFICANT CHANGE UP (ref 135–145)
WBC # BLD: 7.79 K/UL — SIGNIFICANT CHANGE UP (ref 3.8–10.5)
WBC # BLD: 8.19 K/UL — SIGNIFICANT CHANGE UP (ref 3.8–10.5)
WBC # FLD AUTO: 7.79 K/UL — SIGNIFICANT CHANGE UP (ref 3.8–10.5)
WBC # FLD AUTO: 8.19 K/UL — SIGNIFICANT CHANGE UP (ref 3.8–10.5)

## 2021-08-31 RX ORDER — CHLORHEXIDINE GLUCONATE 213 G/1000ML
1 SOLUTION TOPICAL
Refills: 0 | Status: DISCONTINUED | OUTPATIENT
Start: 2021-08-31 | End: 2021-09-01

## 2021-08-31 RX ORDER — IBUPROFEN 200 MG
400 TABLET ORAL EVERY 6 HOURS
Refills: 0 | Status: DISCONTINUED | OUTPATIENT
Start: 2021-08-31 | End: 2021-09-01

## 2021-08-31 RX ORDER — ACETAMINOPHEN 500 MG
1000 TABLET ORAL EVERY 6 HOURS
Refills: 0 | Status: DISCONTINUED | OUTPATIENT
Start: 2021-08-31 | End: 2021-09-01

## 2021-08-31 RX ORDER — MAGNESIUM OXIDE 400 MG ORAL TABLET 241.3 MG
1000 TABLET ORAL
Refills: 0 | Status: DISCONTINUED | OUTPATIENT
Start: 2021-08-31 | End: 2021-08-31

## 2021-08-31 RX ADMIN — HEPARIN SODIUM 5000 UNIT(S): 5000 INJECTION INTRAVENOUS; SUBCUTANEOUS at 05:50

## 2021-08-31 RX ADMIN — OXYCODONE HYDROCHLORIDE 2.5 MILLIGRAM(S): 5 TABLET ORAL at 23:00

## 2021-08-31 RX ADMIN — Medication 1000 MILLIGRAM(S): at 12:19

## 2021-08-31 RX ADMIN — Medication 400 MILLIGRAM(S): at 05:50

## 2021-08-31 RX ADMIN — Medication 1000 MILLIGRAM(S): at 18:00

## 2021-08-31 RX ADMIN — OXYCODONE HYDROCHLORIDE 2.5 MILLIGRAM(S): 5 TABLET ORAL at 22:10

## 2021-08-31 RX ADMIN — Medication 30 MILLIGRAM(S): at 11:49

## 2021-08-31 RX ADMIN — Medication 1000 MILLIGRAM(S): at 23:10

## 2021-08-31 RX ADMIN — CHLORHEXIDINE GLUCONATE 1 APPLICATION(S): 213 SOLUTION TOPICAL at 08:11

## 2021-08-31 RX ADMIN — PANTOPRAZOLE SODIUM 40 MILLIGRAM(S): 20 TABLET, DELAYED RELEASE ORAL at 17:51

## 2021-08-31 RX ADMIN — Medication 30 MILLIGRAM(S): at 06:08

## 2021-08-31 RX ADMIN — Medication 30 MILLIGRAM(S): at 05:50

## 2021-08-31 RX ADMIN — MAGNESIUM OXIDE 400 MG ORAL TABLET 1000 MILLIGRAM(S): 241.3 TABLET ORAL at 17:51

## 2021-08-31 RX ADMIN — Medication 1000 MILLIGRAM(S): at 23:38

## 2021-08-31 RX ADMIN — Medication 1000 MILLIGRAM(S): at 17:51

## 2021-08-31 RX ADMIN — Medication 30 MILLIGRAM(S): at 12:19

## 2021-08-31 RX ADMIN — Medication 400 MILLIGRAM(S): at 11:49

## 2021-08-31 RX ADMIN — PANTOPRAZOLE SODIUM 40 MILLIGRAM(S): 20 TABLET, DELAYED RELEASE ORAL at 05:50

## 2021-08-31 RX ADMIN — HEPARIN SODIUM 5000 UNIT(S): 5000 INJECTION INTRAVENOUS; SUBCUTANEOUS at 21:21

## 2021-08-31 RX ADMIN — Medication 400 MILLIGRAM(S): at 23:38

## 2021-08-31 RX ADMIN — Medication 400 MILLIGRAM(S): at 23:10

## 2021-08-31 RX ADMIN — HEPARIN SODIUM 5000 UNIT(S): 5000 INJECTION INTRAVENOUS; SUBCUTANEOUS at 13:28

## 2021-08-31 RX ADMIN — Medication 1000 MILLIGRAM(S): at 06:08

## 2021-08-31 NOTE — DISCHARGE NOTE PROVIDER - NSDCCPCAREPLAN_GEN_ALL_CORE_FT
PRINCIPAL DISCHARGE DIAGNOSIS  Diagnosis: Diverticulitis of intestine w/o perforation or abscess w/o bleeding  Assessment and Plan of Treatment: WOUND CARE: You may shower. Pat Dry abdomen. Leave the white steri strips in place, they will fall off on their own in approximately 5-7 days.     BATHING: Please do not submerge wound underwater. You may shower and/or sponge bathe.  ACTIVITY: No heavy lifting anything more than 10-15lbs or straining. Otherwise, you may return to your usual level of physical activity. If you are taking narcotic pain medication (such as Percocet), do NOT drive a car, operate machinery or make important decisions.  DIET: Low fiber diet  NOTIFY YOUR SURGEON IF: You have any bleeding that does not stop, any pus draining from your wound, any fever (over 100.4 F) or chills, persistent nausea/vomiting with inability to tolerate food or liquids, persistent diarrhea, or if your pain is not controlled on your discharge pain medications.  FOLLOW-UP:  1. Please call to make a follow-up appointment within one to two weeks of discharge with Dr. Silvestre.  2. Please follow up with your primary care physician in one week regarding your hospitalization.      SECONDARY DISCHARGE DIAGNOSES  Diagnosis: Need for prophylactic measure  Assessment and Plan of Treatment:

## 2021-08-31 NOTE — PROGRESS NOTE ADULT - ASSESSMENT
61F h/o GERD, diverticulitis, UC (in remission), now s/p Lap Assisted LAR 8/30. Recovering appropriately on the floor.    - ERP Protocol   - Diet: CLD ADAT to LRD  - IV Tylenol/Toradol switched to PO on POD2  - MagOx POD2  - D/C Penrose on POD3 or upon discharge  - D/C cheema catheter on POD2.  [facility]  [id number]  61F h/o GERD, diverticulitis, UC (in remission), now s/p Lap Assisted LAR 8/30. Recovering appropriately on the floor.    - ERP Protocol   - Diet: CLD ADAT to LRD  - IV Tylenol/Toradol switched to PO  - MagOx POD2  - D/C Penrose on POD3 or upon discharge  - D/C cheema catheter today.  [facility]  [id number]  61F h/o GERD, diverticulitis, UC (in remission), now s/p Lap Assisted LAR 8/30. Recovering appropriately on the floor.    - ERP Protocol   - Diet: CLD advanced to LRD  - now PO Tylenol/Toradol   - MagOx on 8/31  - UCath d/C'd on 8/31  - C/W Buck   - Determine AC based on Caprini  - d/C Penrose on POD3 or D/C      [facility] Surgery  [id number]

## 2021-08-31 NOTE — DIETITIAN INITIAL EVALUATION ADULT. - CONTINUE CURRENT NUTRITION CARE PLAN
1) Medical team to advance diet as medically feasible. Recommend clear liquids - > low fiber. RD remains available for diet changes as needed/able./yes

## 2021-08-31 NOTE — DISCHARGE NOTE PROVIDER - NSDCMRMEDTOKEN_GEN_ALL_CORE_FT
INVanz ADD-Hawthorne 1 g injection: 1 gram(s) intravenously every 24 hours   End date: 8/29/21  Diagnosis: K57.80  pantoprazole 40 mg oral delayed release tablet: 1 tab(s) orally 2 times a day   acetaminophen 500 mg oral tablet: 2 tab(s) orally every 6 hours  ibuprofen 400 mg oral tablet: 1 tab(s) orally every 6 hours  oxyCODONE 5 mg oral tablet: 0.5 tab(s) orally every 6 hours, As Needed for severe painMDD:4 tabs   pantoprazole 40 mg oral delayed release tablet: 1 tab(s) orally 2 times a day

## 2021-08-31 NOTE — DIETITIAN INITIAL EVALUATION ADULT. - ORAL INTAKE PTA/DIET HISTORY
Pt was eating well with no changes in appetite. Pt was following a low fiber diet. Diet recall revels pt eats well-balanced meals; lean protein with very good understanding of low fiber diet. Pt with allergy to melons and raw carrots resulting in itchy mouth. Denies Hx of chewing or swallowing issues. Denies oral nutrient supplement use. Took Vitamin D3, B12, and a multivitamin with elderberry and other herbal remedies.

## 2021-08-31 NOTE — DIETITIAN INITIAL EVALUATION ADULT. - CHIEF COMPLAINT
Per Chart: Pt is a 62 y/o F Hx GERD, diverticulitis, UC (in remission), now s/p Lap Assisted LAR 8/30. Recovering appropriately on the floor.

## 2021-08-31 NOTE — DIETITIAN INITIAL EVALUATION ADULT. - OTHER INFO
Dosing wt: 201.9 lbs. Reports UBW of ~206 lbs, denies any recent changes in wt. RD will continue to trend as new wts available/able.     Pt is on clear liquids. Denies recent N/V, diarrhea, or constipation. Last BM 8/30 before surgery - not passing flatus.    Provided low-fiber nutrition therapy including importance of avoiding fiber rich foods, fresh fruits/vegetables, whole grains, and added fiber in processed foods. Discussed chewing foods well and adequate hydration. Pt verbalized understanding and accepted written handout.

## 2021-08-31 NOTE — DIETITIAN INITIAL EVALUATION ADULT. - PERTINENT LABORATORY DATA
08-31 Na 138 mmol/L Glu 86 mg/dL K+ 4.2 mmol/L Cr  0.91 mg/dL BUN 9 mg/dL Phos 4.0 mg/dL   A1C with Estimated Average Glucose Result: 5.6 % (08-24-21 @ 00:09)  CAPILLARY BLOOD GLUCOSE  POCT Blood Glucose.: 121 mg/dL (30 Aug 2021 06:28)

## 2021-08-31 NOTE — DISCHARGE NOTE PROVIDER - HOSPITAL COURSE
Patient is a 62yo F with a history of GERD, ulcerative colitis (managed medically as a child, currently in remission), s/p acute diverticulitis attack x 2 (5/6/21 & 8/3/21). Pt was treated with oral ABx in 5/2021 & IV ABx in 8/3/21(On Invanz, LD 8/29/21). s/p CT abdomen &pelvis/colonoscopy- diverticulitis- scheduled for laparoscopic anterior resection, possible open on 8/30/21. Pt denies any fevers, chills, N/V, hematochezia , recent travel or Covid 19 related infections. *Covid 19 PCR on 8/27/21.  On 8/30, patient underwent lap LAR. Patient tolerated procedure well, without complications, was sent to pacu stable with cheema/ucath and clears. Post op, patient hypotensive which improved spontaneously.  POD1, ERAS protocol, ucath and cheema dc and passed TOV, diet advanced to LRD and tolerated, Caprini score: ____.   PT eval: ____.  PENROSE dc prior to dc. At the time of discharge, the patient was hemodynamically stable, was tolerating PO diet, was voiding urine and passing stool, was ambulating, and was comfortable with adequate pain control. The patient was instructed to follow up with Dr. Silvestre within 1-2 weeks after discharge from the hospital. The patient felt comfortable with discharge. The patient was discharged to home/rehab. The patient had no other issues. Patient is a 60yo F with a history of GERD, ulcerative colitis (managed medically as a child, currently in remission), s/p acute diverticulitis attack x 2 (5/6/21 & 8/3/21). Pt was treated with oral ABx in 5/2021 & IV ABx in 8/3/21(On Invanz, LD 8/29/21). s/p CT abdomen &pelvis/colonoscopy- diverticulitis- scheduled for laparoscopic anterior resection, possible open on 8/30/21. Pt denies any fevers, chills, N/V, hematochezia , recent travel or Covid 19 related infections. *Covid 19 PCR on 8/27/21.  On 8/30, patient underwent lap LAR. Patient tolerated procedure well, without complications, was sent to pacu stable with cheema/ucath and clears. Post op, patient hypotensive which improved spontaneously.  POD1, ERAS protocol, ucath and cheema dc and passed TOV, diet advanced to LRD and tolerated, Caprini score: 5. PT recommended no PT needs. PENROSE dc prior to dc.  The patients PICC and midline was removed at bedside. At the time of discharge, the patient was hemodynamically stable, was tolerating PO diet, was voiding urine and passing stool, was ambulating, and was comfortable with adequate pain control. The patient was instructed to follow up with Dr. Silvestre within 1-2 weeks after discharge from the hospital. The patient felt comfortable with discharge. The patient was discharged to home/rehab. The patient had no other issues.

## 2021-08-31 NOTE — DISCHARGE NOTE PROVIDER - NSDCCPTREATMENT_GEN_ALL_CORE_FT
PRINCIPAL PROCEDURE  Procedure: Laparoscopic assisted low anterior resection  Findings and Treatment: · Operative Findings	Inflamed sigmoid colon        SECONDARY PROCEDURE  Procedure: Cystoscopy, with ureteral catheterization  Findings and Treatment: · Operative Findings	normal urethra and bladder mucosa

## 2021-08-31 NOTE — DISCHARGE NOTE PROVIDER - NSDCACTIVITY_GEN_ALL_CORE
Return to Work/School allowed/Do not drive or operate machinery/Showering allowed/Do not make important decisions/Stairs allowed/Walking - Indoors allowed/No heavy lifting/straining/Walking - Outdoors allowed

## 2021-08-31 NOTE — PROGRESS NOTE ADULT - SUBJECTIVE AND OBJECTIVE BOX
24h Events:  No acute events overnight.    Subjective:   Patient seen at bedside this AM.     Objective:  Vital Signs  T(C): 36.6 (08-30 @ 22:14), Max: 36.6 (08-30 @ 22:14)  HR: 68 (08-30 @ 22:14) (61 - 105)  BP: 101/60 (08-30 @ 22:14) (83/46 - 150/84)  RR: 18 (08-30 @ 22:14) (15 - 18)  SpO2: 98% (08-30 @ 22:14) (85% - 100%)  08-30-21 @ 07:01  -  08-31-21 @ 00:04  --------------------------------------------------------  IN:  Total IN: 0 mL    OUT:    Indwelling Catheter - Urethral (mL): 925 mL  Total OUT: 925 mL    Total NET: -925 mL          Physical Exam:  GEN: resting in bed comfortably in NAD  RESP: no increased WOB  ABD: soft, non-distended, non-tender without rebound tenderness or guarding. Incision sites clean, dry, and intact without erythema or edema.  EXTR: warm, well-perfused, no edema  NEURO: awake, alert    Labs:        CAPILLARY BLOOD GLUCOSE      POCT Blood Glucose.: 121 mg/dL (30 Aug 2021 06:28)      Imaging:     24h Events:  No acute events overnight.    Subjective:   Patient seen at bedside this AM. Yesterday she endorsed some mild dizziness and nausea. She has not vomited. She has produed 1525 mL of urine in 24 hours. Indwelling urinary catheter remains in place.     Objective:  Vital Signs  T(C): 36.6 (08-30 @ 22:14), Max: 36.6 (08-30 @ 22:14)  HR: 68 (08-30 @ 22:14) (61 - 105)  BP: 101/60 (08-30 @ 22:14) (83/46 - 150/84)  RR: 18 (08-30 @ 22:14) (15 - 18)  SpO2: 98% (08-30 @ 22:14) (85% - 100%)  08-30-21 @ 07:01  -  08-31-21 @ 00:04  --------------------------------------------------------  IN:  Total IN: 0 mL    OUT:    Indwelling Catheter - Urethral (mL): 925 mL  Total OUT: 925 mL    Total NET: -925 mL    Physical Exam:  GEN: resting in bed comfortably in NAD  RESP: no increased WOB  ABD: soft, non-distended, non-tender without rebound tenderness or guarding. Incision sites clean, dry, and intact without erythema or edema.  EXTR: warm, well-perfused, no edema  NEURO: awake, alert    Labs:      CAPILLARY BLOOD GLUCOSE      POCT Blood Glucose.: 121 mg/dL (30 Aug 2021 06:28)   4h Events:  No acute events overnight.    Subjective:   Patient seen at bedside this AM. Yesterday she endorsed some mild dizziness and nausea. She has not vomited. She has produced 1525 mL of urine in 24 hours. Indwelling urinary catheter remains in place.     Objective:  Vital Signs  T(C): 36.6 (08-30 @ 22:14), Max: 36.6 (08-30 @ 22:14)  HR: 68 (08-30 @ 22:14) (61 - 105)  BP: 101/60 (08-30 @ 22:14) (83/46 - 150/84)  RR: 18 (08-30 @ 22:14) (15 - 18)  SpO2: 98% (08-30 @ 22:14) (85% - 100%)  08-30-21 @ 07:01  -  08-31-21 @ 00:04  --------------------------------------------------------  IN:  Total IN: 0 mL    OUT:    Indwelling Catheter - Urethral (mL): 925 mL  Total OUT: 925 mL    Total NET: -925 mL    Physical Exam:  GEN: resting in bed comfortably in NAD  RESP: no increased WOB  ABD: soft, non-distended, non-tender without rebound tenderness or guarding. Incision sites clean, dry, and intact without erythema or edema.  EXTR: warm, well-perfused, no edema  NEURO: awake, alert

## 2021-08-31 NOTE — DISCHARGE NOTE PROVIDER - CARE PROVIDER_API CALL
Gonzalo Silvestre)  ColonRectal Surgery; Surgery  [address], [address]  [place] [address]  Phone: [phone]  Fax: [phone]  Follow Up Time: 2 weeks

## 2021-08-31 NOTE — PROGRESS NOTE ADULT - SUBJECTIVE AND OBJECTIVE BOX
Day 1 of Anesthesia Pain Management Service    SUBJECTIVE: Doing ok, a little more pain  Pain Scale Score:          [X] Refer to charted pain scores    THERAPY:    s/p    100 mcg PF morphine on 8\30\2021       MEDICATIONS  (STANDING):  acetaminophen   Tablet .. 1000 milliGRAM(s) Oral every 6 hours  acetaminophen  IVPB .. 1000 milliGRAM(s) IV Intermittent every 6 hours  chlorhexidine 2% Cloths 1 Application(s) Topical <User Schedule>  heparin   Injectable 5000 Unit(s) SubCutaneous every 8 hours  ibuprofen  Tablet. 400 milliGRAM(s) Oral every 6 hours  ketorolac   Injectable 30 milliGRAM(s) IV Push every 6 hours  magnesium oxide 1000 milliGRAM(s) Oral two times a day with meals  morphine PF Spinal 0.1 milliGRAM(s) IntraThecal. once  pantoprazole    Tablet 40 milliGRAM(s) Oral two times a day    MEDICATIONS  (PRN):  naloxone Injectable 0.1 milliGRAM(s) IV Push every 3 minutes PRN For ANY of the following changes in patient status:  A. RR LESS THAN 10 breaths per minute, B. Oxygen saturation LESS THAN 90%, C. Sedation score of 6  oxyCODONE    IR 2.5 milliGRAM(s) Oral every 4 hours PRN Moderate Pain (4 - 6)  oxyCODONE    IR 5 milliGRAM(s) Oral every 4 hours PRN Severe Pain (7 - 10)      OBJECTIVE:    Sedation:        	[X] Alert	 [ ] Drowsy	[ ] Arousable      [ ] Asleep       [ ] Unresponsive    Side Effects:	[X] None 	[ ] Nausea	[ ] Vomiting         [ ] Pruritus  		[ ] Weakness            [ ] Numbness	          [ ] Other:    Vital Signs Last 24 Hrs  T(C): 36.4 (31 Aug 2021 09:45), Max: 36.6 (30 Aug 2021 22:14)  T(F): 97.5 (31 Aug 2021 09:45), Max: 97.9 (30 Aug 2021 22:14)  HR: 61 (31 Aug 2021 09:45) (61 - 105)  BP: 99/66 (31 Aug 2021 09:45) (83/46 - 126/64)  BP(mean): 68 (30 Aug 2021 17:00) (61 - 89)  RR: 18 (31 Aug 2021 09:45) (15 - 18)  SpO2: 96% (31 Aug 2021 09:45) (85% - 100%)    ASSESSMENT/ PLAN  [X] Patient transitioned to prn analgesics  [X] Pain management per primary service, pain service to sign off   [X]Documentation and Verification of current medications

## 2021-08-31 NOTE — DISCHARGE NOTE PROVIDER - NSDCFUADDINST_GEN_ALL_CORE_FT
avoid raw fruits and vegetables, nuts and seeds avoid raw fruits and vegetables, nuts and seeds  Please take Tylenol every 6 hours, and stagger with ibuprofen every 6 hours. This will allow you to alternate medications for more consistent pain control. For example: take a dose of tylenol at 8 am, then take a dose of ibuprofen at 11 am, then take a dose of tylenol at 2pm, and continue as needed. Do not exceed 4,000mg of Tylenol in 24 hours.

## 2021-08-31 NOTE — DIETITIAN INITIAL EVALUATION ADULT. - PERTINENT MEDS FT
MEDICATIONS  (STANDING):  acetaminophen   Tablet .. 1000 milliGRAM(s) Oral every 6 hours  acetaminophen  IVPB .. 1000 milliGRAM(s) IV Intermittent every 6 hours  chlorhexidine 2% Cloths 1 Application(s) Topical <User Schedule>  heparin   Injectable 5000 Unit(s) SubCutaneous every 8 hours  ibuprofen  Tablet. 400 milliGRAM(s) Oral every 6 hours  ketorolac   Injectable 30 milliGRAM(s) IV Push every 6 hours  magnesium oxide 1000 milliGRAM(s) Oral two times a day with meals  morphine PF Spinal 0.1 milliGRAM(s) IntraThecal. once  pantoprazole    Tablet 40 milliGRAM(s) Oral two times a day

## 2021-09-01 ENCOUNTER — TRANSCRIPTION ENCOUNTER (OUTPATIENT)
Age: 61
End: 2021-09-01

## 2021-09-01 VITALS
SYSTOLIC BLOOD PRESSURE: 140 MMHG | TEMPERATURE: 98 F | RESPIRATION RATE: 18 BRPM | OXYGEN SATURATION: 96 % | HEART RATE: 67 BPM | DIASTOLIC BLOOD PRESSURE: 72 MMHG

## 2021-09-01 LAB
ANION GAP SERPL CALC-SCNC: 11 MMOL/L — SIGNIFICANT CHANGE UP (ref 5–17)
BUN SERPL-MCNC: 10 MG/DL — SIGNIFICANT CHANGE UP (ref 7–23)
CALCIUM SERPL-MCNC: 8.6 MG/DL — SIGNIFICANT CHANGE UP (ref 8.4–10.5)
CHLORIDE SERPL-SCNC: 106 MMOL/L — SIGNIFICANT CHANGE UP (ref 96–108)
CO2 SERPL-SCNC: 25 MMOL/L — SIGNIFICANT CHANGE UP (ref 22–31)
CREAT SERPL-MCNC: 0.92 MG/DL — SIGNIFICANT CHANGE UP (ref 0.5–1.3)
GLUCOSE SERPL-MCNC: 75 MG/DL — SIGNIFICANT CHANGE UP (ref 70–99)
HCT VFR BLD CALC: 36.4 % — SIGNIFICANT CHANGE UP (ref 34.5–45)
HGB BLD-MCNC: 11.9 G/DL — SIGNIFICANT CHANGE UP (ref 11.5–15.5)
MAGNESIUM SERPL-MCNC: 2.2 MG/DL — SIGNIFICANT CHANGE UP (ref 1.6–2.6)
MCHC RBC-ENTMCNC: 29.7 PG — SIGNIFICANT CHANGE UP (ref 27–34)
MCHC RBC-ENTMCNC: 32.7 GM/DL — SIGNIFICANT CHANGE UP (ref 32–36)
MCV RBC AUTO: 90.8 FL — SIGNIFICANT CHANGE UP (ref 80–100)
NRBC # BLD: 0 /100 WBCS — SIGNIFICANT CHANGE UP (ref 0–0)
PHOSPHATE SERPL-MCNC: 1.9 MG/DL — LOW (ref 2.5–4.5)
PLATELET # BLD AUTO: 241 K/UL — SIGNIFICANT CHANGE UP (ref 150–400)
POTASSIUM SERPL-MCNC: 4 MMOL/L — SIGNIFICANT CHANGE UP (ref 3.5–5.3)
POTASSIUM SERPL-SCNC: 4 MMOL/L — SIGNIFICANT CHANGE UP (ref 3.5–5.3)
RBC # BLD: 4.01 M/UL — SIGNIFICANT CHANGE UP (ref 3.8–5.2)
RBC # FLD: 13.8 % — SIGNIFICANT CHANGE UP (ref 10.3–14.5)
SODIUM SERPL-SCNC: 142 MMOL/L — SIGNIFICANT CHANGE UP (ref 135–145)
WBC # BLD: 6.1 K/UL — SIGNIFICANT CHANGE UP (ref 3.8–10.5)
WBC # FLD AUTO: 6.1 K/UL — SIGNIFICANT CHANGE UP (ref 3.8–10.5)

## 2021-09-01 PROCEDURE — 88307 TISSUE EXAM BY PATHOLOGIST: CPT

## 2021-09-01 PROCEDURE — 82962 GLUCOSE BLOOD TEST: CPT

## 2021-09-01 PROCEDURE — 88304 TISSUE EXAM BY PATHOLOGIST: CPT

## 2021-09-01 PROCEDURE — 84100 ASSAY OF PHOSPHORUS: CPT

## 2021-09-01 PROCEDURE — C9399: CPT

## 2021-09-01 PROCEDURE — C1758: CPT

## 2021-09-01 PROCEDURE — 85027 COMPLETE CBC AUTOMATED: CPT

## 2021-09-01 PROCEDURE — C1889: CPT

## 2021-09-01 PROCEDURE — 83735 ASSAY OF MAGNESIUM: CPT

## 2021-09-01 PROCEDURE — 80048 BASIC METABOLIC PNL TOTAL CA: CPT

## 2021-09-01 RX ORDER — ACETAMINOPHEN 500 MG
2 TABLET ORAL
Qty: 0 | Refills: 0 | DISCHARGE
Start: 2021-09-01

## 2021-09-01 RX ORDER — IBUPROFEN 200 MG
1 TABLET ORAL
Qty: 0 | Refills: 0 | DISCHARGE
Start: 2021-09-01

## 2021-09-01 RX ORDER — OXYCODONE HYDROCHLORIDE 5 MG/1
0.5 TABLET ORAL
Qty: 4 | Refills: 0
Start: 2021-09-01 | End: 2021-09-02

## 2021-09-01 RX ORDER — SODIUM,POTASSIUM PHOSPHATES 278-250MG
2 POWDER IN PACKET (EA) ORAL
Refills: 0 | Status: COMPLETED | OUTPATIENT
Start: 2021-09-01 | End: 2021-09-01

## 2021-09-01 RX ADMIN — Medication 1000 MILLIGRAM(S): at 05:11

## 2021-09-01 RX ADMIN — OXYCODONE HYDROCHLORIDE 5 MILLIGRAM(S): 5 TABLET ORAL at 10:00

## 2021-09-01 RX ADMIN — CHLORHEXIDINE GLUCONATE 1 APPLICATION(S): 213 SOLUTION TOPICAL at 07:56

## 2021-09-01 RX ADMIN — HEPARIN SODIUM 5000 UNIT(S): 5000 INJECTION INTRAVENOUS; SUBCUTANEOUS at 05:11

## 2021-09-01 RX ADMIN — Medication 2 PACKET(S): at 10:01

## 2021-09-01 RX ADMIN — Medication 400 MILLIGRAM(S): at 05:55

## 2021-09-01 RX ADMIN — PANTOPRAZOLE SODIUM 40 MILLIGRAM(S): 20 TABLET, DELAYED RELEASE ORAL at 05:11

## 2021-09-01 RX ADMIN — Medication 1000 MILLIGRAM(S): at 05:55

## 2021-09-01 RX ADMIN — Medication 400 MILLIGRAM(S): at 05:11

## 2021-09-01 RX ADMIN — Medication 2 PACKET(S): at 11:07

## 2021-09-01 NOTE — PROGRESS NOTE ADULT - SUBJECTIVE AND OBJECTIVE BOX
24h Events:  No acute events overnight.    Subjective:   Patient seen at bedside this AM.     Objective:  Vital Signs  T(C): 36.5 (08-31 @ 21:02), Max: 36.8 (08-31 @ 17:01)  HR: 69 (08-31 @ 21:02) (61 - 75)  BP: 104/66 (08-31 @ 21:02) (87/56 - 104/66)  RR: 18 (08-31 @ 21:02) (18 - 18)  SpO2: 96% (08-31 @ 21:02) (94% - 100%)  08-30-21 @ 07:01  -  08-31-21 @ 07:00  --------------------------------------------------------  IN:  Total IN: 0 mL    OUT:    Indwelling Catheter - Urethral (mL): 1525 mL  Total OUT: 1525 mL    Total NET: -1525 mL      08-31-21 @ 07:01  -  09-01-21 @ 00:12  --------------------------------------------------------  IN:  Total IN: 0 mL    OUT:    Indwelling Catheter - Urethral (mL): 150 mL    Voided (mL): 800 mL  Total OUT: 950 mL    Total NET: -950 mL      Physical Exam:  GEN: resting in bed comfortably in NAD  RESP: no increased WOB  ABD: soft, non-distended, non-tender without rebound tenderness or guarding. Incision sites clean, dry, and intact without erythema or edema.  EXTR: warm, well-perfused, no edema  NEURO: awake, alert      Labs:                        12.3   8.19  )-----------( 243      ( 31 Aug 2021 16:30 )             38.0   08-31    138  |  103  |  9   ----------------------------<  86  4.2   |  24  |  0.91    Ca    8.2<L>      31 Aug 2021 07:00  Phos  4.0     08-31  Mg     2.2     08-31    TPro  x   /  Alb  x   /  TBili  0.4  /  DBili  x   /  AST  x   /  ALT  x   /  AlkPhos  x   08-31    CAPILLARY BLOOD GLUCOSE          Imaging:     24h Events:  No acute events overnight.    Subjective:   Patient seen at bedside this AM. Denies any new complaints. Pain improving. Denies subjective fevers, chills.     Objective:  Vital Signs  T(C): 36.5 (08-31 @ 21:02), Max: 36.8 (08-31 @ 17:01)  HR: 69 (08-31 @ 21:02) (61 - 75)  BP: 104/66 (08-31 @ 21:02) (87/56 - 104/66)  RR: 18 (08-31 @ 21:02) (18 - 18)  SpO2: 96% (08-31 @ 21:02) (94% - 100%)  08-30-21 @ 07:01  -  08-31-21 @ 07:00  --------------------------------------------------------  IN:  Total IN: 0 mL    OUT:    Indwelling Catheter - Urethral (mL): 1525 mL  Total OUT: 1525 mL    Total NET: -1525 mL      08-31-21 @ 07:01  -  09-01-21 @ 00:12  --------------------------------------------------------  IN:  Total IN: 0 mL    OUT:    Indwelling Catheter - Urethral (mL): 150 mL    Voided (mL): 800 mL  Total OUT: 950 mL    Total NET: -950 mL      Physical Exam:  GEN: resting in bed comfortably in NAD  RESP: no increased WOB  ABD: soft, non-distended, non-tender without rebound tenderness or guarding. Incision sites clean, dry, and intact without erythema or edema.  EXTR: warm, well-perfused, no edema  NEURO: awake, alert      Labs:                        12.3   8.19  )-----------( 243      ( 31 Aug 2021 16:30 )             38.0   08-31    138  |  103  |  9   ----------------------------<  86  4.2   |  24  |  0.91    Ca    8.2<L>      31 Aug 2021 07:00  Phos  4.0     08-31  Mg     2.2     08-31    TPro  x   /  Alb  x   /  TBili  0.4  /  DBili  x   /  AST  x   /  ALT  x   /  AlkPhos  x   08-31    CAPILLARY BLOOD GLUCOSE          Imaging:

## 2021-09-01 NOTE — DISCHARGE NOTE NURSING/CASE MANAGEMENT/SOCIAL WORK - PATIENT PORTAL LINK FT
You can access the FollowMyHealth Patient Portal offered by [facility] by registering at the following website: [email or web address]. By joining [employer]’s FollowMyHealth portal, you will also be able to view your health information using other applications (apps) compatible with our system.

## 2021-09-01 NOTE — PROGRESS NOTE ADULT - ASSESSMENT
61F h/o GERD, diverticulitis, UC (in remission), now s/p Lap Assisted LAR 8/30. Recovering appropriately on the floor.    - ERAS Protocol   - Diet: CLD advanced to LRD  - now PO Tylenol/Toradol   - MagOx on 8/31  - UCath d/C'd on 8/31  - C/W Buck   - Determine AC based on Caprini  - d/C Penrose on POD3 or D/C      [place] Team Surgery  [id number]  61F h/o GERD, diverticulitis, UC (in remission), now s/p Lap Assisted LAR 8/30. Recovering appropriately on the floor.    -ERAS Protocol   -Diet: LRD  -PO Tylenol/Toradol   -AC based on Caprini  -d/C Penrose      [facility] Team Surgery  [id number]  61F h/o GERD, diverticulitis, UC (in remission), now s/p Lap Assisted LAR 8/30. Recovering appropriately on the floor.    -ERAS Protocol   -Diet: LRD  -PO Tylenol/Toradol   -No AC  -d/C Penrose, PICC  -D/C today    [place] Team Surgery  [id number]

## 2021-09-02 LAB — SURGICAL PATHOLOGY STUDY: SIGNIFICANT CHANGE UP

## 2021-09-02 RX ORDER — ERTAPENEM SODIUM 1 G/1
1 INJECTION, POWDER, LYOPHILIZED, FOR SOLUTION INTRAMUSCULAR; INTRAVENOUS
Refills: 0 | Status: DISCONTINUED | COMMUNITY
End: 2021-09-02

## 2021-09-02 RX ORDER — HEPARIN SODIUM,PORCINE 10 UNIT/ML
AMPUL (ML) INTRAVENOUS
Refills: 0 | Status: DISCONTINUED | COMMUNITY
End: 2021-09-02

## 2021-09-02 RX ORDER — METRONIDAZOLE 250 MG/1
250 TABLET ORAL
Qty: 3 | Refills: 0 | Status: DISCONTINUED | COMMUNITY
Start: 2021-08-20 | End: 2021-09-02

## 2021-09-02 RX ORDER — NEOMYCIN SULFATE 500 MG/1
500 TABLET ORAL
Qty: 3 | Refills: 0 | Status: DISCONTINUED | COMMUNITY
Start: 2021-08-20 | End: 2021-09-02

## 2021-09-04 RX ORDER — OXYCODONE 5 MG/1
5 TABLET ORAL
Qty: 10 | Refills: 0 | Status: ACTIVE | COMMUNITY
Start: 2021-09-04 | End: 1900-01-01

## 2021-09-07 ENCOUNTER — EMERGENCY (EMERGENCY)
Facility: HOSPITAL | Age: 61
LOS: 1 days | Discharge: HOME | End: 2021-09-07
Attending: EMERGENCY MEDICINE
Payer: COMMERCIAL

## 2021-09-07 VITALS
SYSTOLIC BLOOD PRESSURE: 176 MMHG | OXYGEN SATURATION: 97 % | HEART RATE: 94 BPM | HEIGHT: 63 IN | RESPIRATION RATE: 20 BRPM | TEMPERATURE: 98 F | DIASTOLIC BLOOD PRESSURE: 91 MMHG | WEIGHT: 205.91 LBS

## 2021-09-07 VITALS
HEART RATE: 69 BPM | SYSTOLIC BLOOD PRESSURE: 133 MMHG | DIASTOLIC BLOOD PRESSURE: 67 MMHG | OXYGEN SATURATION: 99 % | RESPIRATION RATE: 16 BRPM

## 2021-09-07 DIAGNOSIS — Z98.1 ARTHRODESIS STATUS: Chronic | ICD-10-CM

## 2021-09-07 DIAGNOSIS — Z98.890 OTHER SPECIFIED POSTPROCEDURAL STATES: Chronic | ICD-10-CM

## 2021-09-07 DIAGNOSIS — Z90.89 ACQUIRED ABSENCE OF OTHER ORGANS: Chronic | ICD-10-CM

## 2021-09-07 LAB
ALBUMIN SERPL ELPH-MCNC: 3.9 G/DL — SIGNIFICANT CHANGE UP (ref 3.3–5)
ALP SERPL-CCNC: 132 U/L — HIGH (ref 40–120)
ALT FLD-CCNC: 129 U/L — HIGH (ref 10–45)
ANION GAP SERPL CALC-SCNC: 13 MMOL/L — SIGNIFICANT CHANGE UP (ref 5–17)
APPEARANCE UR: CLEAR — SIGNIFICANT CHANGE UP
APTT BLD: 32.7 SEC — SIGNIFICANT CHANGE UP (ref 27.5–35.5)
AST SERPL-CCNC: 53 U/L — HIGH (ref 10–40)
BACTERIA # UR AUTO: NEGATIVE — SIGNIFICANT CHANGE UP
BASOPHILS # BLD AUTO: 0.05 K/UL — SIGNIFICANT CHANGE UP (ref 0–0.2)
BASOPHILS NFR BLD AUTO: 0.7 % — SIGNIFICANT CHANGE UP (ref 0–2)
BILIRUB SERPL-MCNC: 0.5 MG/DL — SIGNIFICANT CHANGE UP (ref 0.2–1.2)
BILIRUB UR-MCNC: NEGATIVE — SIGNIFICANT CHANGE UP
BLD GP AB SCN SERPL QL: NEGATIVE — SIGNIFICANT CHANGE UP
BUN SERPL-MCNC: 16 MG/DL — SIGNIFICANT CHANGE UP (ref 7–23)
CALCIUM SERPL-MCNC: 9.6 MG/DL — SIGNIFICANT CHANGE UP (ref 8.4–10.5)
CHLORIDE SERPL-SCNC: 104 MMOL/L — SIGNIFICANT CHANGE UP (ref 96–108)
CO2 SERPL-SCNC: 24 MMOL/L — SIGNIFICANT CHANGE UP (ref 22–31)
COLOR SPEC: SIGNIFICANT CHANGE UP
CREAT SERPL-MCNC: 0.81 MG/DL — SIGNIFICANT CHANGE UP (ref 0.5–1.3)
DIFF PNL FLD: NEGATIVE — SIGNIFICANT CHANGE UP
EOSINOPHIL # BLD AUTO: 0.27 K/UL — SIGNIFICANT CHANGE UP (ref 0–0.5)
EOSINOPHIL NFR BLD AUTO: 4 % — SIGNIFICANT CHANGE UP (ref 0–6)
EPI CELLS # UR: 1 /HPF — SIGNIFICANT CHANGE UP
GAS PNL BLDV: SIGNIFICANT CHANGE UP
GLUCOSE SERPL-MCNC: 99 MG/DL — SIGNIFICANT CHANGE UP (ref 70–99)
GLUCOSE UR QL: NEGATIVE — SIGNIFICANT CHANGE UP
HCT VFR BLD CALC: 40.4 % — SIGNIFICANT CHANGE UP (ref 34.5–45)
HGB BLD-MCNC: 13.2 G/DL — SIGNIFICANT CHANGE UP (ref 11.5–15.5)
HYALINE CASTS # UR AUTO: 0 /LPF — SIGNIFICANT CHANGE UP (ref 0–2)
IMM GRANULOCYTES NFR BLD AUTO: 0.3 % — SIGNIFICANT CHANGE UP (ref 0–1.5)
INR BLD: 1.23 RATIO — HIGH (ref 0.88–1.16)
KETONES UR-MCNC: NEGATIVE — SIGNIFICANT CHANGE UP
LEUKOCYTE ESTERASE UR-ACNC: NEGATIVE — SIGNIFICANT CHANGE UP
LIDOCAIN IGE QN: 16 U/L — SIGNIFICANT CHANGE UP (ref 7–60)
LYMPHOCYTES # BLD AUTO: 1.27 K/UL — SIGNIFICANT CHANGE UP (ref 1–3.3)
LYMPHOCYTES # BLD AUTO: 19 % — SIGNIFICANT CHANGE UP (ref 13–44)
MCHC RBC-ENTMCNC: 29.1 PG — SIGNIFICANT CHANGE UP (ref 27–34)
MCHC RBC-ENTMCNC: 32.7 GM/DL — SIGNIFICANT CHANGE UP (ref 32–36)
MCV RBC AUTO: 89.2 FL — SIGNIFICANT CHANGE UP (ref 80–100)
MONOCYTES # BLD AUTO: 0.54 K/UL — SIGNIFICANT CHANGE UP (ref 0–0.9)
MONOCYTES NFR BLD AUTO: 8.1 % — SIGNIFICANT CHANGE UP (ref 2–14)
NEUTROPHILS # BLD AUTO: 4.54 K/UL — SIGNIFICANT CHANGE UP (ref 1.8–7.4)
NEUTROPHILS NFR BLD AUTO: 67.9 % — SIGNIFICANT CHANGE UP (ref 43–77)
NITRITE UR-MCNC: NEGATIVE — SIGNIFICANT CHANGE UP
NRBC # BLD: 0 /100 WBCS — SIGNIFICANT CHANGE UP (ref 0–0)
PH UR: 7 — SIGNIFICANT CHANGE UP (ref 5–8)
PLATELET # BLD AUTO: 318 K/UL — SIGNIFICANT CHANGE UP (ref 150–400)
POTASSIUM SERPL-MCNC: 3.8 MMOL/L — SIGNIFICANT CHANGE UP (ref 3.5–5.3)
POTASSIUM SERPL-SCNC: 3.8 MMOL/L — SIGNIFICANT CHANGE UP (ref 3.5–5.3)
PROT SERPL-MCNC: 6.9 G/DL — SIGNIFICANT CHANGE UP (ref 6–8.3)
PROT UR-MCNC: NEGATIVE — SIGNIFICANT CHANGE UP
PROTHROM AB SERPL-ACNC: 14.6 SEC — HIGH (ref 10.6–13.6)
RBC # BLD: 4.53 M/UL — SIGNIFICANT CHANGE UP (ref 3.8–5.2)
RBC # FLD: 13.7 % — SIGNIFICANT CHANGE UP (ref 10.3–14.5)
RBC CASTS # UR COMP ASSIST: 0 /HPF — SIGNIFICANT CHANGE UP (ref 0–4)
RH IG SCN BLD-IMP: POSITIVE — SIGNIFICANT CHANGE UP
SARS-COV-2 RNA SPEC QL NAA+PROBE: SIGNIFICANT CHANGE UP
SODIUM SERPL-SCNC: 141 MMOL/L — SIGNIFICANT CHANGE UP (ref 135–145)
SP GR SPEC: 1.01 — LOW (ref 1.01–1.02)
UROBILINOGEN FLD QL: NEGATIVE — SIGNIFICANT CHANGE UP
WBC # BLD: 6.69 K/UL — SIGNIFICANT CHANGE UP (ref 3.8–10.5)
WBC # FLD AUTO: 6.69 K/UL — SIGNIFICANT CHANGE UP (ref 3.8–10.5)
WBC UR QL: 1 /HPF — SIGNIFICANT CHANGE UP (ref 0–5)

## 2021-09-07 PROCEDURE — 82330 ASSAY OF CALCIUM: CPT

## 2021-09-07 PROCEDURE — 85610 PROTHROMBIN TIME: CPT

## 2021-09-07 PROCEDURE — 84132 ASSAY OF SERUM POTASSIUM: CPT

## 2021-09-07 PROCEDURE — 74177 CT ABD & PELVIS W/CONTRAST: CPT | Mod: 26,MA

## 2021-09-07 PROCEDURE — 99284 EMERGENCY DEPT VISIT MOD MDM: CPT | Mod: 25

## 2021-09-07 PROCEDURE — 82803 BLOOD GASES ANY COMBINATION: CPT

## 2021-09-07 PROCEDURE — U0005: CPT

## 2021-09-07 PROCEDURE — 83605 ASSAY OF LACTIC ACID: CPT

## 2021-09-07 PROCEDURE — 85018 HEMOGLOBIN: CPT

## 2021-09-07 PROCEDURE — 83690 ASSAY OF LIPASE: CPT

## 2021-09-07 PROCEDURE — 85730 THROMBOPLASTIN TIME PARTIAL: CPT

## 2021-09-07 PROCEDURE — 96374 THER/PROPH/DIAG INJ IV PUSH: CPT | Mod: XU

## 2021-09-07 PROCEDURE — 84295 ASSAY OF SERUM SODIUM: CPT

## 2021-09-07 PROCEDURE — 74177 CT ABD & PELVIS W/CONTRAST: CPT | Mod: MA

## 2021-09-07 PROCEDURE — 82947 ASSAY GLUCOSE BLOOD QUANT: CPT

## 2021-09-07 PROCEDURE — 86850 RBC ANTIBODY SCREEN: CPT

## 2021-09-07 PROCEDURE — 82435 ASSAY OF BLOOD CHLORIDE: CPT

## 2021-09-07 PROCEDURE — 86900 BLOOD TYPING SEROLOGIC ABO: CPT

## 2021-09-07 PROCEDURE — 85025 COMPLETE CBC W/AUTO DIFF WBC: CPT

## 2021-09-07 PROCEDURE — 99285 EMERGENCY DEPT VISIT HI MDM: CPT

## 2021-09-07 PROCEDURE — 86901 BLOOD TYPING SEROLOGIC RH(D): CPT

## 2021-09-07 PROCEDURE — 85014 HEMATOCRIT: CPT

## 2021-09-07 PROCEDURE — U0003: CPT

## 2021-09-07 PROCEDURE — 81001 URINALYSIS AUTO W/SCOPE: CPT

## 2021-09-07 PROCEDURE — 80053 COMPREHEN METABOLIC PANEL: CPT

## 2021-09-07 RX ORDER — OXYCODONE HYDROCHLORIDE 5 MG/1
1 TABLET ORAL
Qty: 8 | Refills: 0
Start: 2021-09-07 | End: 2021-09-08

## 2021-09-07 RX ORDER — MORPHINE SULFATE 50 MG/1
4 CAPSULE, EXTENDED RELEASE ORAL ONCE
Refills: 0 | Status: DISCONTINUED | OUTPATIENT
Start: 2021-09-07 | End: 2021-09-07

## 2021-09-07 RX ADMIN — MORPHINE SULFATE 4 MILLIGRAM(S): 50 CAPSULE, EXTENDED RELEASE ORAL at 12:10

## 2021-09-07 RX ADMIN — MORPHINE SULFATE 4 MILLIGRAM(S): 50 CAPSULE, EXTENDED RELEASE ORAL at 11:29

## 2021-09-07 NOTE — ED PROVIDER NOTE - ATTENDING CONTRIBUTION TO CARE
61F hx diverticulitis w perf and abscess with recent bowel resection 8/30 here with persistent RLQ pain. Pt is afebrile, No NVDC, having BMs, no urinary sx. Reports severe pain to RLQ which has been present since surgery, slight worsening, despite tyl and advil ATC and oxycodone periodically. PE tearful F mild distress d/t pain, +BS, soft, ND, RLQ and suprapubic TTP, no peritoneal signs. Labs, UA, CTAP to eval for post-op complication, vs infection. Pain control. C/s to colorectal. 61F hx diverticulitis w perf and abscess with recent bowel resection 8/30 here with persistent RLQ pain. Pt is afebrile, No NVDC, having BMs, no urinary sx. Reports severe pain to RLQ which has been present since surgery, slight worsening, despite tyl and advil ATC and half of an oxycodone periodically. PE tearful F mild distress d/t pain, +BS, soft, ND, RLQ and suprapubic TTP, no peritoneal signs. Labs, UA, CTAP to eval for post-op complication vs infection vs poorly managed post op pain. Pain control. C/s to colorectal.

## 2021-09-07 NOTE — ED PROVIDER NOTE - OBJECTIVE STATEMENT
62 yo female with recent colon resection s.p perforated diverticulitis here for worsening RLQ ttp. Pt states pain is the same pain as when she left the hospital last week however pain has been worsening. Pt took percocet yesterday with no relief of pain. No fevers, chills, nausea, vomiting, decreased PO. Pt with normal BMs, passing flatus, no bloody stools.

## 2021-09-07 NOTE — ED PROVIDER NOTE - NSFOLLOWUPINSTRUCTIONS_ED_ALL_ED_FT
- stay hydrated.   - take tylenol 975mg and ibuprofen 600mg every 6 hours as needed for pain-take with meals.  -take oxycodone 5 mg every 4-6  hours as needed for severe pain  -take miralax daily when taking oxycodone  - follow up with your surgeon tomorrow as recommended by surgery.   - return if symptoms worsen, fever, weakness, inability to eat/drink and all other concerns. - stay hydrated.   - take tylenol 975mg and ibuprofen 600mg every 6 hours as needed for pain-take with meals.  -take oxycodone 5 mg every 4-6  hours as needed for severe pain  -take miralax daily when taking oxycodone  - follow up with Gonzalo Silvestre tomorrow 9/8/21, call [phone]  - return if symptoms worsen, fever, weakness, inability to eat/drink and all other concerns.

## 2021-09-07 NOTE — ED PROVIDER NOTE - PHYSICAL EXAMINATION
PHYSICAL EXAM:    GENERAL: in mild-mod distress 2/2 pain  HEENT:  Atraumatic  CHEST/LUNG: Chest rise equal bilaterally  HEART: Regular rate and rhythm  ABDOMEN: Soft, + RLQ ttp, + laporoscopic surgical sites clean, dry, intact, + umbilical surgical site with small area of dehiscence to superior aspect approx 0.25cm, with clear discharge, no purulence, surrounding erythema.   EXTREMITIES:  Extremities warm  PSYCH: A&Ox3  SKIN: No obvious rashes or lesions  MSK: No cervical spine TTP, able to range neck to the left and right/ No midline spinal TTP/ No swelling, redness, pain or discharge from   NEUROLOGY: strength and sensation intact in all extremities. CN 2 - 12 intact. Finger to nose test intact. No pronator drift. Ambulatory without difficulty.

## 2021-09-07 NOTE — ED ADULT TRIAGE NOTE - ACCOMPANIED BY
Ortho Injruy Treatment    Date/Time: 5/21/2021 3:22 PM  Performed by: Paris Malave RN  Authorized by: Jeannine Mason MD   Injury location: knee  Location details: right knee  Injury type: soft tissue  Immobilization: crutches  Comments: Knee immobilizer          Self

## 2021-09-07 NOTE — ED PROVIDER NOTE - PROGRESS NOTE DETAILS
spoke with surgery, pt evaluated and can go home, will need to f/u in the morning can write for more pain meds. -Bridget Florence PA-C Pt feels well, tolerating PO. -Bridget Florence PA-C

## 2021-09-07 NOTE — CONSULT NOTE ADULT - SUBJECTIVE AND OBJECTIVE BOX
Attending:    Patient is a 61y old  Female who presents with a chief complaint of     HPI:      PAST MEDICAL & SURGICAL HISTORY:  Ulcerative colitis  Resolved    Acute diverticulitis    GERD (gastroesophageal reflux disease)    OA (osteoarthritis)    H/O constipation    History of bunionectomy    H/O eye surgery    History of tonsillectomy    H/O ankle fusion  Left        ROS: Negative except for HPI    MEDICATIONS:              Allergies    penicillin (Rash)  Raw Carrots and Melon (Hives)    Intolerances        SOCIAL HISTORY: Denies tobacco, social ETOH, denies illicit drug use    FAMILY HISTORY:  Family history of emphysema (Father, Mother)        Vital Signs Last 24 Hrs  T(C): 36.8 (07 Sep 2021 11:00), Max: 36.9 (07 Sep 2021 10:25)  T(F): 98.2 (07 Sep 2021 11:00), Max: 98.5 (07 Sep 2021 10:25)  HR: 79 (07 Sep 2021 11:00) (79 - 94)  BP: 151/72 (07 Sep 2021 11:00) (151/72 - 176/91)  BP(mean): --  RR: 18 (07 Sep 2021 11:00) (18 - 20)  SpO2: 99% (07 Sep 2021 11:00) (97% - 99%)    PHYSICAL EXAM:    Constitutional: NAD well-developed  HEENT: PERRLA, EOMI  Neck: No JVD  Respiratory: CTAB, Cardiovascular: S1 and S2, RRR, no M/G/R  Gastrointestinal: BS+, soft, NT/ND  Extremities: No peripheral edema  Vascular: 2+ peripheral pulses  Neurological: A/O x 3, no focal deficits  Skin: No rashes    LABS:                        13.2   6.69  )-----------( 318      ( 07 Sep 2021 11:21 )             40.4     09-07    141  |  104  |  16  ----------------------------<  99  3.8   |  24  |  0.81    Ca    9.6      07 Sep 2021 11:21    TPro  6.9  /  Alb  3.9  /  TBili  0.5  /  DBili  x   /  AST  53<H>  /  ALT  129<H>  /  AlkPhos  132<H>  09-07    PT/INR - ( 07 Sep 2021 11:21 )   PT: 14.6 sec;   INR: 1.23 ratio         PTT - ( 07 Sep 2021 11:21 )  PTT:32.7 sec        RADIOLOGY & ADDITIONAL STUDIES:    ASSESSMENT/PLAN:  Patient is a 61y old  Female who presents with a chief complaint of     -  -  -  - HPI:      PAST MEDICAL & SURGICAL HISTORY:  Ulcerative colitis  Resolved    Acute diverticulitis    GERD (gastroesophageal reflux disease)    OA (osteoarthritis)    H/O constipation    History of bunionectomy    H/O eye surgery    History of tonsillectomy    H/O ankle fusion  Left        ROS: Negative except for HPI    MEDICATIONS:  Home Medications:  acetaminophen 500 mg oral tablet: 2 tab(s) orally every 6 hours (01 Sep 2021 08:09)  ibuprofen 400 mg oral tablet: 1 tab(s) orally every 6 hours (01 Sep 2021 08:09)  pantoprazole 40 mg oral delayed release tablet: 1 tab(s) orally 2 times a day (30 Aug 2021 06:22)      Allergies    penicillin (Rash)  Raw Carrots and Melon (Hives)    Intolerances        SOCIAL HISTORY: Denies tobacco, social ETOH, denies illicit drug use    FAMILY HISTORY:  Family history of emphysema (Father, Mother)        Vital Signs Last 24 Hrs  T(C): 36.8 (07 Sep 2021 11:00), Max: 36.9 (07 Sep 2021 10:25)  T(F): 98.2 (07 Sep 2021 11:00), Max: 98.5 (07 Sep 2021 10:25)  HR: 79 (07 Sep 2021 11:00) (79 - 94)  BP: 151/72 (07 Sep 2021 11:00) (151/72 - 176/91)  BP(mean): --  RR: 18 (07 Sep 2021 11:00) (18 - 20)  SpO2: 99% (07 Sep 2021 11:00) (97% - 99%)    PHYSICAL EXAM:    Constitutional: NAD   HEENT: PERRLA, EOMI  Respiratory: Unlabored   Cardiovascular: RRR  Gastrointestinal: soft, appropriately TTP, no rebound or guarding.   Extremities: No peripheral edema  Vascular: 2+ peripheral pulses  Neurological: A/O x 3      LABS:                        13.2   6.69  )-----------( 318      ( 07 Sep 2021 11:21 )             40.4     09-07    141  |  104  |  16  ----------------------------<  99  3.8   |  24  |  0.81    Ca    9.6      07 Sep 2021 11:21    TPro  6.9  /  Alb  3.9  /  TBili  0.5  /  DBili  x   /  AST  53<H>  /  ALT  129<H>  /  AlkPhos  132<H>  09-07    PT/INR - ( 07 Sep 2021 11:21 )   PT: 14.6 sec;   INR: 1.23 ratio         PTT - ( 07 Sep 2021 11:21 )  PTT:32.7 sec        RADIOLOGY & ADDITIONAL STUDIES:   HPI:  61F h/o GERD, diverticulitis, UC (in remission), now s/p Lap Assisted LAR 8/30, presenting with persistent abdominal pain since discharge. Patient states abdominal pain has remained constant and without improvement since surgery, particularly in the RLQ. She states that after a few days she started taking her prn oxycodone with minimal/no improvement in symptoms. She denies fever, chills, n/v, cp, sob. She endorses flatus and BM. Pt states she is eating and drinking normally without increased abdominal pain.     In ED pt afebrile and HD stable. Labs with mildly elevated LFTs, otherwise unremarkable. CT A/P without evidence acute intra-abdominal pathology.       PAST MEDICAL & SURGICAL HISTORY:  Ulcerative colitis  Resolved    Acute diverticulitis    GERD (gastroesophageal reflux disease)    OA (osteoarthritis)    H/O constipation    History of bunionectomy    H/O eye surgery    History of tonsillectomy    H/O ankle fusion  Left        ROS: Negative except for HPI    MEDICATIONS:  Home Medications:  acetaminophen 500 mg oral tablet: 2 tab(s) orally every 6 hours (01 Sep 2021 08:09)  ibuprofen 400 mg oral tablet: 1 tab(s) orally every 6 hours (01 Sep 2021 08:09)  pantoprazole 40 mg oral delayed release tablet: 1 tab(s) orally 2 times a day (30 Aug 2021 06:22)      Allergies    penicillin (Rash)  Raw Carrots and Melon (Hives)    Intolerances        SOCIAL HISTORY: Denies tobacco, social ETOH, denies illicit drug use    FAMILY HISTORY:  Family history of emphysema (Father, Mother)        Vital Signs Last 24 Hrs  T(C): 36.8 (07 Sep 2021 11:00), Max: 36.9 (07 Sep 2021 10:25)  T(F): 98.2 (07 Sep 2021 11:00), Max: 98.5 (07 Sep 2021 10:25)  HR: 79 (07 Sep 2021 11:00) (79 - 94)  BP: 151/72 (07 Sep 2021 11:00) (151/72 - 176/91)  BP(mean): --  RR: 18 (07 Sep 2021 11:00) (18 - 20)  SpO2: 99% (07 Sep 2021 11:00) (97% - 99%)    PHYSICAL EXAM:    Constitutional: NAD   HEENT: PERRLA, EOMI  Respiratory: Unlabored   Cardiovascular: RRR  Gastrointestinal: soft, appropriately TTP, no rebound or guarding. Increased tenderness surrounding RLQ incision (overlying ecchymosis). Incisions healing well, no surrounding erythema or drainage.   Extremities: No peripheral edema  Vascular: 2+ peripheral pulses  Neurological: A/O x 3      LABS:                        13.2   6.69  )-----------( 318      ( 07 Sep 2021 11:21 )             40.4     09-07    141  |  104  |  16  ----------------------------<  99  3.8   |  24  |  0.81    Ca    9.6      07 Sep 2021 11:21    TPro  6.9  /  Alb  3.9  /  TBili  0.5  /  DBili  x   /  AST  53<H>  /  ALT  129<H>  /  AlkPhos  132<H>  09-07    PT/INR - ( 07 Sep 2021 11:21 )   PT: 14.6 sec;   INR: 1.23 ratio         PTT - ( 07 Sep 2021 11:21 )  PTT:32.7 sec        RADIOLOGY & ADDITIONAL STUDIES:    EXAM:  CT ABDOMEN AND PELVIS OC IC                            PROCEDURE DATE:  09/07/2021            INTERPRETATION:  CLINICAL INFORMATION: Recent bowel resection. Abdominal pain.    COMPARISON: 8/18/2021    CONTRAST/COMPLICATIONS:  IV Contrast: Omnipaque 350. 90 mL administered. 10 mL discarded.  Oral Contrast: Oral contrast was administered.  Complications: None reported at the time of exam    PROCEDURE:  CT of the Abdomen and Pelvis was performed.  Sagittal and coronal reformats were performed.    FINDINGS:  LOWER CHEST: Within normal limits.    LIVER: Within normal limits.  BILE DUCTS: Normal caliber.  GALLBLADDER: Within normal limits.  SPLEEN: Within normal limits.  PANCREAS: Within normal limits.  ADRENALS: Within normal limits.  KIDNEYS/URETERS: Nonobstructing stone in the lower pole of the left kidney measuring 5 mm. No obstructive uropathy.    BLADDER: Within normal limits.  REPRODUCTIVE ORGANS: Uterus and bilateral adnexa within normal limits.    BOWEL: No bowel obstruction. Appendix is normal. Distal colectomy with patent rectal colonic anastomosis.  PERITONEUM: No ascites. No drainable fluid collection.  VESSELS: Within normal limits.  RETROPERITONEUM/LYMPH NODES: No lymphadenopathy.  ABDOMINAL WALL: Postoperative changes and subcutaneous injection sites.  BONES: Degenerative changes.    IMPRESSION:  New postoperative changes of the bowel.  No evidence of acute abnormality in the abdomen and pelvis.

## 2021-09-07 NOTE — ED PROVIDER NOTE - PATIENT PORTAL LINK FT
You can access the FollowMyHealth Patient Portal offered by [facility] by registering at the following website: [email or web address]. By joining [facility]’s FollowMyHealth portal, you will also be able to view your health information using other applications (apps) compatible with our system.

## 2021-09-07 NOTE — CONSULT NOTE ADULT - ASSESSMENT
61F h/o GERD, diverticulitis, UC (in remission), now s/p Lap Assisted LAR 8/30 presenting with persistent abdominal pain     - No acute surgical intervention. Pt with benign abdominal exam, no GI symptoms, evidence of acute intra-abdominal pathology on imaging.   - Optimize pain control: tylenol, motrin, oxycodone prn   - Pt agreeable to discharge home with return precautions   - Please follow up with Dr. Silvestre's office tomorrow 9/8  - Discussed with Dr. Guaman     Call for appointment:   Gonzalo Silvestre MD   Phone: [phone]    Oneyda Mosley PGY3   [facility]   [id number]

## 2021-09-22 ENCOUNTER — APPOINTMENT (OUTPATIENT)
Dept: SURGERY | Facility: CLINIC | Age: 61
End: 2021-09-22
Payer: COMMERCIAL

## 2021-09-22 VITALS
HEART RATE: 82 BPM | SYSTOLIC BLOOD PRESSURE: 148 MMHG | DIASTOLIC BLOOD PRESSURE: 80 MMHG | RESPIRATION RATE: 16 BRPM | OXYGEN SATURATION: 97 % | TEMPERATURE: 97.5 F

## 2021-09-22 DIAGNOSIS — L53.9 ERYTHEMATOUS CONDITION, UNSPECIFIED: ICD-10-CM

## 2021-09-22 PROCEDURE — 99024 POSTOP FOLLOW-UP VISIT: CPT

## 2021-09-22 RX ORDER — MELATONIN 3 MG
TABLET ORAL
Refills: 0 | Status: ACTIVE | COMMUNITY

## 2021-09-22 RX ORDER — LORATADINE 5 MG
TABLET,CHEWABLE ORAL
Refills: 0 | Status: ACTIVE | COMMUNITY

## 2021-09-22 RX ORDER — MUPIROCIN 20 MG/G
2 OINTMENT TOPICAL
Qty: 1 | Refills: 3 | Status: ACTIVE | COMMUNITY
Start: 2021-09-22 | End: 1900-01-01

## 2021-09-22 NOTE — ASSESSMENT
[FreeTextEntry1] : Patient recovering as expected.  Small area of mild erythema at right lower quadrant port site.  Bactroban prescribed.  Patient will call immediately if this does not improve.  Otherwise, follow-up 3 weeks.  Patient will advance her diet.

## 2021-09-22 NOTE — HISTORY OF PRESENT ILLNESS
[FreeTextEntry1] : Yoon is a 61 year old female here for post op visit s/p Laparoscopic-assisted anterior resection with low pelvic anastomosis between distal descending colon and mid rectum 8/30/21 due to diverticulitis with chronic abscess. Pathology; colorectum, rectosigmoid, resection- diverticulosis with diverticulitis, abscess formation, foreign body giant cell reaction and acute serositis and serosal adhesion. resection margins are unremarkable. Colorectum, proximal and distal donut, excision- annular segments of colon with no significant diagnostic alterations. \par \par Colonoscopy 3/9/18 by Dr. Brunner- retroflexed views revealed internal hemorrhoids. \par CT abdomen pelvis 5/6/21- acute sigmoid diverticulitis with pericolic fat stranding. no evidence for abscess. \par CT abdomen pelvis 8/3/21- acute, contained perforation of the sigmoid colon with moderate pericolonic inflammatory changed. findings may be secondary to perforated diverticulitis, however, colonoscopy is recommended after resolution of the acute symptomatology to exclude other etiologies. trace ascites. several small periaortic lymph nodes. nonobstructing 0.5 cm left intrarenal calculus. \par \par Today pt reports abdominal soreness. Pt reports taking Tylenol, Ibuprofen and Oxycodone post surgery. Pt reports approximately 5 small BMs daily, formed/liquid, no pain, no bleeding. No episodes of incontinence. Pt tolerating low fiber diet, good appetite, no nausea, no vomiting. Pt reports redness around 1 surgical incision since 9/21.

## 2021-09-30 PROBLEM — M25.561 ACUTE PAIN OF RIGHT KNEE: Status: ACTIVE | Noted: 2019-01-29

## 2021-09-30 PROBLEM — M17.11 PATELLOFEMORAL ARTHRITIS OF RIGHT KNEE: Status: ACTIVE | Noted: 2019-01-29

## 2021-10-13 ENCOUNTER — APPOINTMENT (OUTPATIENT)
Dept: SURGERY | Facility: CLINIC | Age: 61
End: 2021-10-13
Payer: COMMERCIAL

## 2021-10-13 VITALS
DIASTOLIC BLOOD PRESSURE: 84 MMHG | OXYGEN SATURATION: 97 % | RESPIRATION RATE: 17 BRPM | SYSTOLIC BLOOD PRESSURE: 136 MMHG | HEIGHT: 63 IN | HEART RATE: 69 BPM | BODY MASS INDEX: 35.26 KG/M2 | TEMPERATURE: 97.8 F | WEIGHT: 199 LBS

## 2021-10-13 DIAGNOSIS — Z09 ENCOUNTER FOR FOLLOW-UP EXAMINATION AFTER COMPLETED TREATMENT FOR CONDITIONS OTHER THAN MALIGNANT NEOPLASM: ICD-10-CM

## 2021-10-13 PROCEDURE — 99024 POSTOP FOLLOW-UP VISIT: CPT

## 2021-10-13 RX ORDER — PREDNISOLONE ACETATE 10 MG/ML
1 SUSPENSION/ DROPS OPHTHALMIC
Refills: 0 | Status: ACTIVE | COMMUNITY

## 2021-10-13 NOTE — PHYSICAL EXAM
[Abdomen Masses] : No abdominal masses [Abdomen Tenderness] : ~T No ~M abdominal tenderness [de-identified] : Normal wound healing

## 2021-11-18 NOTE — PRE-OP CHECKLIST - HEIGHT IN FEET
Caller: Suha SANTACRUZIN    Relationship: SELF    Best call back number: [phone]    Requested Prescriptions:   Requested Prescriptions      No prescriptions requested or ordered in this encounter      HYDROcodone-acetaminophen (NORCO) 5-325 MG       Pharmacy where request should be sent:  [facility]/pharmacy #[id number] - [place], [place] - [address] [place] [address] AT [place] [id number]       Does the patient have less than a 3 day supply:  [x] Yes  [] No    Sharon Villa Rep   11/18/21 09:39 EST            5

## 2021-12-08 ENCOUNTER — APPOINTMENT (OUTPATIENT)
Dept: SURGERY | Facility: CLINIC | Age: 61
End: 2021-12-08
Payer: COMMERCIAL

## 2021-12-08 VITALS
OXYGEN SATURATION: 97 % | HEART RATE: 71 BPM | TEMPERATURE: 97.6 F | RESPIRATION RATE: 16 BRPM | SYSTOLIC BLOOD PRESSURE: 157 MMHG | DIASTOLIC BLOOD PRESSURE: 89 MMHG

## 2021-12-08 DIAGNOSIS — K57.20 DIVERTICULITIS OF LARGE INTESTINE WITH PERFORATION AND ABSCESS W/OUT BLEEDING: ICD-10-CM

## 2021-12-08 PROCEDURE — 99213 OFFICE O/P EST LOW 20 MIN: CPT

## 2021-12-08 NOTE — ASSESSMENT
[FreeTextEntry1] : Patient doing well.  Bowel function slowly becoming more regular.  Patient will follow up as needed.  Patient asked if I could do her follow-up colonoscopies.  Recall to be sent 10 years from last exam.

## 2021-12-08 NOTE — HISTORY OF PRESENT ILLNESS
[FreeTextEntry1] : Yoon is a 61 year old female here for post op visit s/p Laparoscopic-assisted anterior resection with low pelvic anastomosis between distal descending colon and mid rectum 8/30/21 due to diverticulitis with chronic abscess. Pathology; colorectum, rectosigmoid, resection- diverticulosis with diverticulitis, abscess formation, foreign body giant cell reaction and acute serositis and serosal adhesion. resection margins are unremarkable. Colorectum, proximal and distal donut, excision- annular segments of colon with no significant diagnostic alterations. \par \par Colonoscopy 3/9/18 by Dr. Brunner- retroflexed views revealed internal hemorrhoids. \par CT abdomen pelvis 5/6/21- acute sigmoid diverticulitis with pericolic fat stranding. no evidence for abscess. \par CT abdomen pelvis 8/3/21- acute, contained perforation of the sigmoid colon with moderate pericolonic inflammatory changed. findings may be secondary to perforated diverticulitis, however, colonoscopy is recommended after resolution of the acute symptomatology to exclude other etiologies. trace ascites. several small periaortic lymph nodes. nonobstructing 0.5 cm left intrarenal calculus. \par \par Last seen 10/13/21- No abdominal masses. No abdominal tenderness. Normal wound healing. Doing well. Follow up 2 months. \par \par Today pt reports feeling well, no pain. 8 mixed BMs daily, no pain, no bleeding, no straining. No episodes of incontinence of stool or gas. Pt tolerating regular diet, no nausea, no vomiting. Not on anticoagulants.

## 2024-08-05 NOTE — ED PROVIDER NOTE - TOBACCO USE
Patient's father, Cristopher called wondering if patient needs labs prior to upcoming CPE 8/14/24. He last had TSH and free T4 2/19/24. Last CMP and CBC 1/24/23. No lipid panel on file.     Please advise what labs you would like ordered prior to upcoming visit. Father, Cristopher would like a call back at: [phone]   Unknown if ever smoked